# Patient Record
Sex: FEMALE | Race: BLACK OR AFRICAN AMERICAN | Employment: PART TIME | URBAN - METROPOLITAN AREA
[De-identification: names, ages, dates, MRNs, and addresses within clinical notes are randomized per-mention and may not be internally consistent; named-entity substitution may affect disease eponyms.]

---

## 2019-03-27 ENCOUNTER — ED HISTORICAL/CONVERTED ENCOUNTER (OUTPATIENT)
Dept: OTHER | Age: 29
End: 2019-03-27

## 2019-03-29 LAB — PAP SMEAR, EXTERNAL: NORMAL

## 2020-07-28 ENCOUNTER — TELEPHONE (OUTPATIENT)
Dept: OBGYN CLINIC | Age: 30
End: 2020-07-28

## 2020-07-28 NOTE — TELEPHONE ENCOUNTER
Patient having a problem with her nexplanon and would like to speak with a nurse regarding that issue.

## 2020-08-15 PROBLEM — N76.0 ACUTE VAGINITIS: Status: ACTIVE | Noted: 2020-08-15

## 2020-08-15 PROBLEM — A59.9 TRICHOMONOSIS: Status: ACTIVE | Noted: 2020-08-15

## 2020-08-15 PROBLEM — R87.619 ABNORMAL PAP SMEAR OF CERVIX: Status: ACTIVE | Noted: 2020-08-15

## 2020-08-17 ENCOUNTER — OFFICE VISIT (OUTPATIENT)
Dept: OBGYN CLINIC | Age: 30
End: 2020-08-17
Payer: MEDICAID

## 2020-08-17 VITALS
BODY MASS INDEX: 32.94 KG/M2 | DIASTOLIC BLOOD PRESSURE: 80 MMHG | HEIGHT: 62 IN | SYSTOLIC BLOOD PRESSURE: 120 MMHG | WEIGHT: 179 LBS

## 2020-08-17 DIAGNOSIS — Z30.46 SURVEILLANCE OF IMPLANTABLE SUBDERMAL CONTRACEPTIVE: ICD-10-CM

## 2020-08-17 DIAGNOSIS — N94.89 SUPPRESSION OF MENSES: Primary | ICD-10-CM

## 2020-08-17 PROCEDURE — 99213 OFFICE O/P EST LOW 20 MIN: CPT | Performed by: OBSTETRICS & GYNECOLOGY

## 2020-08-17 PROCEDURE — 11982 REMOVE DRUG IMPLANT DEVICE: CPT | Performed by: OBSTETRICS & GYNECOLOGY

## 2020-08-17 RX ORDER — NORGESTIMATE AND ETHINYL ESTRADIOL 0.25-0.035
1 KIT ORAL DAILY
Qty: 1 DOSE PACK | Refills: 14 | Status: SHIPPED | OUTPATIENT
Start: 2020-08-17 | End: 2021-10-11

## 2020-08-17 NOTE — PROGRESS NOTES
Patient is a 34yo  1 para 1001, last menstrual period 2 weeks ago, history of spontaneous vaginal delivery May 23, 2012 viable male infant 6 lbs. 7 oz. at full-term, history of Trichomonas 2014, 3+ year history of Nexplanon use, history of multiple nasal polypectomies, medical history notable for depression and anxiety, history of LGSIL, with negative colposcopic biopsies May 16, 2019, who presents for Nexplanon removal.    Patient has multiple complaints related to the C/ [place]. She is nervous because she cannot feel it very well. She has not had a cycle, and she states her libido is diminished. Currently interested in combined OCs. Patient continues to report a bleach smell in the vagina, but denies discharge itching vaginal bleeding or pelvic pain. She does not currently have a sexual partner. Last sexual encounter was early . She denies pain or bleeding with sex. .    Last Pap smear 2019-LGSIL; STI screen was negative at that time    Past Medical History:   Diagnosis Date    Abnormal Pap smear of cervix 8/15/2020    Acute vaginitis 8/15/2020    Asthma     Goiter     Graves disease     Hx of seasonal allergies     Trichomonosis 8/15/2020     Past Surgical History:   Procedure Laterality Date    HX OTHER SURGICAL      nasal polyp 3/22/2013,     HX POLYPECTOMY      nasal polypectomy    HX POLYPECTOMY N/A     Nasal x3      Social History     Socioeconomic History    Marital status: SINGLE     Spouse name: Not on file    Number of children: Not on file    Years of education: Not on file    Highest education level: Not on file   Tobacco Use    Smoking status: Current Every Day Smoker     Packs/day: 0.25     Years: 2.00     Pack years: 0.50    Smokeless tobacco: Never Used   Substance and Sexual Activity    Alcohol use: No     Alcohol/week: 0.8 standard drinks     Types: 1 Glasses of wine per week     Comment: drinks 1 small wine a month    Drug use: No    Sexual activity: Yes     Birth control/protection: Implant     Comment: James Escobar      Family History   Problem Relation Age of Onset    Hypertension Mother       Current Outpatient Medications on File Prior to Visit   Medication Sig Dispense Refill    RISPERIDONE (RISPERDAL PO) Take 1 Tab by mouth daily.  etonogestrel (IMPLANON) 68 mg impl by SubDERmal route. No current facility-administered medications on file prior to visit. Allergies as of 08/17/2020 - Review Complete 08/17/2020   Allergen Reaction Noted    Aleve [naproxen sodium] Unknown (comments) 08/15/2020    Benzocaine Swelling 01/22/2014     Constitutional:   Chaperone: accepted and present. General Appearance: healthy-appearing, well-nourished, and well-developed; black female. Level of Distress: NAD. Ambulation: ambulating normally. Psychiatric:   Insight: good judgement. Mental Status: normal mood and affect and active and alert. Orientation: to time, place, and person. Memory: recent memory normal and remote memory normal.     Head: Head: normocephalic and atraumatic. Neck:   Neck: supple, FROM, trachea midline, and no masses. Lymph Nodes: no cervical LAD, supraclavicular LAD, axillary LAD, or inguinal LAD. Thyroid: no enlargement or nodules and non-tender. Lungs:   Respiratory effort: no dyspnea. Auscultation: no wheezing, rales/crackles, or rhonchi and breath sounds normal, good air movement, and CTA except as noted. Cardiovascular:   Heart Auscultation: normal S1 and S2; no murmurs, rubs, or gallops; and RRR. Pulses including femoral / pedal: normal throughout. Breast: Breast: no masses or abnormal secretions and normal appearance. Abdomen: Bowel Sounds: normal. Inspection and Palpation: no tenderness, guarding, masses, rebound tenderness, or CVA tenderness and non-distended. Liver: non-tender and no hepatomegaly. Spleen: non-tender and no splenomegaly. Hernia: none palpable.    Incisions none    Musculoskeletal[de-identified]   Motor Strength and Tone: normal tone and motor strength. Joints, Bones, and Muscles: no contractures, malalignment, tenderness, or bony abnormalities and normal movement of all extremities. Extremities: no cyanosis, edema, varicosities, or palpable cord. Neurologic:   Gait and Station: normal gait and station. Sensation: grossly intact. Reflexes: DTRs 2+ bilaterally throughout. Skin:   Inspection and palpation: no rash, lesions, ulcer, induration, nodules, jaundice, or abnormal nevi and good turgor. Nails: normal.     Back: Thoracolumbar Appearance: normal curvature. Procedure note:    Left upper extremity was examined and the Nexplanon was noted to be on the medial aspect of the upper arm. The prior insertion site was injected with 3 mL's of 1% lidocaine. The skin was prepped and draped. A 4 mm incision was made, and the implant was milked towards the incision, grasped with a hemostat and removed without difficulty. The skin incision was closed with a Steri-Strip and draped with elastic tape. Patient was advised to remove the bandage in approximately 48 hours. The patient tolerated the procedure well. ICD-10-CM ICD-9-CM    1. Suppression of menses  N94.89 626.8    2.  Surveillance of implantable subdermal contraceptive  Z30.46 V25.43      Nexplanon removed without difficulty  Sprintec for suppression of menses  Follow-up for annual exam

## 2021-01-31 ENCOUNTER — HOSPITAL ENCOUNTER (INPATIENT)
Age: 31
LOS: 10 days | Discharge: HOME | DRG: 751 | End: 2021-02-11
Attending: PSYCHIATRY & NEUROLOGY | Admitting: PSYCHIATRY & NEUROLOGY
Payer: MEDICAID

## 2021-01-31 DIAGNOSIS — R45.850 HOMICIDAL IDEATIONS: Primary | ICD-10-CM

## 2021-01-31 DIAGNOSIS — R45.851 SUICIDAL IDEATION: ICD-10-CM

## 2021-01-31 LAB
ALBUMIN SERPL-MCNC: 4.9 G/DL (ref 3.5–5)
ALBUMIN/GLOB SERPL: 1.2 {RATIO} (ref 1.1–2.2)
ALP SERPL-CCNC: 62 U/L (ref 45–117)
ALT SERPL-CCNC: 36 U/L (ref 12–78)
ANION GAP SERPL CALC-SCNC: 8 MMOL/L (ref 5–15)
APPEARANCE UR: CLEAR
AST SERPL W P-5'-P-CCNC: 36 U/L (ref 15–37)
BACTERIA URNS QL MICRO: NEGATIVE /HPF
BASOPHILS # BLD: 0 K/UL (ref 0–0.1)
BASOPHILS NFR BLD: 0 % (ref 0–1)
BILIRUB SERPL-MCNC: 0.8 MG/DL (ref 0.2–1)
BILIRUB UR QL: NEGATIVE
BUN SERPL-MCNC: 10 MG/DL (ref 6–20)
BUN/CREAT SERPL: 7 (ref 12–20)
CA-I BLD-MCNC: 9.8 MG/DL (ref 8.5–10.1)
CHLORIDE SERPL-SCNC: 102 MMOL/L (ref 97–108)
CO2 SERPL-SCNC: 28 MMOL/L (ref 21–32)
COLOR UR: ABNORMAL
COVID-19 RAPID TEST, COVR: NOT DETECTED
CREAT SERPL-MCNC: 1.41 MG/DL (ref 0.55–1.02)
DIFFERENTIAL METHOD BLD: NORMAL
EOSINOPHIL # BLD: 0 K/UL (ref 0–0.4)
EOSINOPHIL NFR BLD: 0 % (ref 0–7)
ERYTHROCYTE [DISTWIDTH] IN BLOOD BY AUTOMATED COUNT: 14.2 % (ref 11.5–14.5)
GLOBULIN SER CALC-MCNC: 4.1 G/DL (ref 2–4)
GLUCOSE SERPL-MCNC: 110 MG/DL (ref 65–100)
GLUCOSE UR STRIP.AUTO-MCNC: NEGATIVE MG/DL
HCG SERPL QL: NEGATIVE
HCT VFR BLD AUTO: 38.7 % (ref 35–47)
HGB BLD-MCNC: 13 G/DL (ref 11.5–16)
HGB UR QL STRIP: ABNORMAL
IMM GRANULOCYTES # BLD AUTO: 0 K/UL (ref 0–0.04)
IMM GRANULOCYTES NFR BLD AUTO: 0 % (ref 0–0.5)
KETONES UR QL STRIP.AUTO: NEGATIVE MG/DL
LEUKOCYTE ESTERASE UR QL STRIP.AUTO: NEGATIVE
LYMPHOCYTES # BLD: 1.8 K/UL (ref 0.8–3.5)
LYMPHOCYTES NFR BLD: 18 % (ref 12–49)
MCH RBC QN AUTO: 32.3 PG (ref 26–34)
MCHC RBC AUTO-ENTMCNC: 33.6 G/DL (ref 30–36.5)
MCV RBC AUTO: 96.3 FL (ref 80–99)
MONOCYTES # BLD: 0.9 K/UL (ref 0–1)
MONOCYTES NFR BLD: 9 % (ref 5–13)
MUCOUS THREADS URNS QL MICRO: ABNORMAL /LPF
NEUTS SEG # BLD: 7.2 K/UL (ref 1.8–8)
NEUTS SEG NFR BLD: 73 % (ref 32–75)
NITRITE UR QL STRIP.AUTO: NEGATIVE
PH UR STRIP: 6 [PH] (ref 5–8)
PLATELET # BLD AUTO: 252 K/UL (ref 150–400)
PMV BLD AUTO: 10 FL (ref 8.9–12.9)
POTASSIUM SERPL-SCNC: 3.4 MMOL/L (ref 3.5–5.1)
PROT SERPL-MCNC: 9 G/DL (ref 6.4–8.2)
PROT UR STRIP-MCNC: 30 MG/DL
RBC # BLD AUTO: 4.02 M/UL (ref 3.8–5.2)
RBC #/AREA URNS HPF: ABNORMAL /HPF (ref 0–5)
SARS-COV-2, COV2: NORMAL
SODIUM SERPL-SCNC: 138 MMOL/L (ref 136–145)
SP GR UR REFRACTOMETRY: 1.01 (ref 1–1.03)
SPECIMEN SOURCE: NORMAL
UA: UC IF INDICATED,UAUC: ABNORMAL
UROBILINOGEN UR QL STRIP.AUTO: 0.1 EU/DL (ref 0.1–1)
WBC # BLD AUTO: 9.9 K/UL (ref 3.6–11)
WBC URNS QL MICRO: ABNORMAL /HPF (ref 0–4)

## 2021-01-31 PROCEDURE — 85025 COMPLETE CBC W/AUTO DIFF WBC: CPT

## 2021-01-31 PROCEDURE — 87635 SARS-COV-2 COVID-19 AMP PRB: CPT

## 2021-01-31 PROCEDURE — 80053 COMPREHEN METABOLIC PANEL: CPT

## 2021-01-31 PROCEDURE — 99285 EMERGENCY DEPT VISIT HI MDM: CPT

## 2021-01-31 PROCEDURE — 84703 CHORIONIC GONADOTROPIN ASSAY: CPT

## 2021-01-31 PROCEDURE — 36415 COLL VENOUS BLD VENIPUNCTURE: CPT

## 2021-01-31 PROCEDURE — 81001 URINALYSIS AUTO W/SCOPE: CPT

## 2021-01-31 PROCEDURE — 80307 DRUG TEST PRSMV CHEM ANLYZR: CPT

## 2021-01-31 NOTE — ED TRIAGE NOTES
Pt requesting admit to hospital for anxiety, depression and paranoia. Denies recent suicidal thoughts.

## 2021-02-01 PROBLEM — F32.A DEPRESSION: Status: ACTIVE | Noted: 2021-02-01

## 2021-02-01 LAB
AMPHET UR QL SCN: NEGATIVE
BARBITURATES UR QL SCN: NEGATIVE
BENZODIAZ UR QL: NEGATIVE
CANNABINOIDS UR QL SCN: NEGATIVE
COCAINE UR QL SCN: NEGATIVE
DRUG SCRN COMMENT,DRGCM: NORMAL
METHADONE UR QL: NEGATIVE
OPIATES UR QL: NEGATIVE
PCP UR QL: NEGATIVE

## 2021-02-01 PROCEDURE — 74011250637 HC RX REV CODE- 250/637: Performed by: PSYCHIATRY & NEUROLOGY

## 2021-02-01 PROCEDURE — 65220000003 HC RM SEMIPRIVATE PSYCH

## 2021-02-01 PROCEDURE — 74011250637 HC RX REV CODE- 250/637: Performed by: INTERNAL MEDICINE

## 2021-02-01 RX ORDER — SERTRALINE HYDROCHLORIDE 25 MG/1
25 TABLET, FILM COATED ORAL DAILY
Status: DISCONTINUED | OUTPATIENT
Start: 2021-02-02 | End: 2021-02-02

## 2021-02-01 RX ORDER — RISPERIDONE 2 MG/1
2 TABLET, FILM COATED ORAL DAILY
Status: DISCONTINUED | OUTPATIENT
Start: 2021-02-01 | End: 2021-02-04

## 2021-02-01 RX ORDER — ADHESIVE BANDAGE
30 BANDAGE TOPICAL DAILY PRN
Status: DISCONTINUED | OUTPATIENT
Start: 2021-02-01 | End: 2021-02-11 | Stop reason: HOSPADM

## 2021-02-01 RX ORDER — ACETAMINOPHEN 325 MG/1
650 TABLET ORAL
Status: DISCONTINUED | OUTPATIENT
Start: 2021-02-01 | End: 2021-02-11 | Stop reason: HOSPADM

## 2021-02-01 RX ORDER — POTASSIUM CHLORIDE 750 MG/1
40 TABLET, FILM COATED, EXTENDED RELEASE ORAL
Status: COMPLETED | OUTPATIENT
Start: 2021-02-01 | End: 2021-02-01

## 2021-02-01 RX ORDER — TRAZODONE HYDROCHLORIDE 50 MG/1
50 TABLET ORAL
Status: DISCONTINUED | OUTPATIENT
Start: 2021-02-01 | End: 2021-02-11 | Stop reason: HOSPADM

## 2021-02-01 RX ORDER — IBUPROFEN 200 MG
1 TABLET ORAL DAILY
Status: DISCONTINUED | OUTPATIENT
Start: 2021-02-01 | End: 2021-02-11 | Stop reason: HOSPADM

## 2021-02-01 RX ORDER — HYDROXYZINE 50 MG/1
50 TABLET, FILM COATED ORAL
Status: DISCONTINUED | OUTPATIENT
Start: 2021-02-01 | End: 2021-02-08

## 2021-02-01 RX ADMIN — TRAZODONE HYDROCHLORIDE 50 MG: 50 TABLET ORAL at 23:10

## 2021-02-01 RX ADMIN — RISPERIDONE 2 MG: 2 TABLET ORAL at 09:13

## 2021-02-01 RX ADMIN — POTASSIUM CHLORIDE 40 MEQ: 750 TABLET, FILM COATED, EXTENDED RELEASE ORAL at 19:00

## 2021-02-01 NOTE — BH NOTES
Substance Use Assessment:    Pt reports that she used to smoke weed but stopped and has been sober since 2014.
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
## 2021-02-01 NOTE — ROUTINE PROCESS
TRANSFER - OUT REPORT:    Verbal report given to danuta on [facility]  being transferred to [facility].(unit) for routine progression of care       Report consisted of patients Situation, Background, Assessment and   Recommendations(SBAR). Information from the following report(s) ED Summary was reviewed with the receiving nurse. Lines:       Opportunity for questions and clarification was provided.       Patient transported with:   [employer]

## 2021-02-01 NOTE — CONSULTS
Consult Date: 2/1/2021    Chief Complaint:   Chief Complaint   Patient presents with   [address] Problem   Patient is 27years old -American female who has been admitted for psychiatry evaluation and treatment.   She denies any history of cough fever or chills no history of nausea vomiting diarrhea abdominal pain or black stool no history of increased frequency of micturition or painful micturition no history of any joint pain or joint swelling no history of headache or dizziness loss of loss of consciousness or seizures no history of change in vision no history of chest pain or shortness of breath   HPI: HPI as above  ROS:ROS   Constitutional: Negative  HEENT: Negative  CVS: Negative  RS: Negative  GI: Negative  : Negative  Musculoskeletal: Negative  Immunology: Records are not available  Neurology: Negative  Endocrine: Negative  Haem-Onc: Negative  Skin: Negative  Psychiatry: Depression  Allergies  Allergies   Allergen Reactions    Aleve [Naproxen Sodium] Unknown (comments)    Benzocaine Swelling     FAMILY HISTORY:  Family History   Problem Relation Age of Onset    Hypertension Mother    Mother has also diabetes and aunt has cancer of the breast  SOCIAL HISTORY:  Social History     Socioeconomic History    Marital status: SINGLE     Spouse name: Not on file    Number of children: Not on file    Years of education: Not on file    Highest education level: Not on file   Occupational History    Not on file   Social Needs    Financial resource strain: Not on file    Food insecurity     Worry: Not on file     Inability: Not on file    Transportation needs     Medical: Not on file     Non-medical: Not on file   Tobacco Use    Smoking status: Current Every Day Smoker     Packs/day: 0.25     Years: 2.00     Pack years: 0.50    Smokeless tobacco: Never Used   Substance and Sexual Activity    Alcohol use: No     Alcohol/week: 0.8 standard drinks     Types: 1 Glasses of wine per week Comment: drinks 1 small wine a month    Drug use: No    Sexual activity: Yes     Birth control/protection: Implant     Comment: NEXPLANON   Lifestyle    Physical activity     Days per week: Not on file     Minutes per session: Not on file    Stress: Not on file   Relationships    Social connections     Talks on phone: Not on file     Gets together: Not on file     Attends Buddhism service: Not on file     Active member of club or organization: Not on file     Attends meetings of clubs or organizations: Not on file     Relationship status: Not on file    Intimate partner violence     Fear of current or ex partner: Not on file     Emotionally abused: Not on file     Physically abused: Not on file     Forced sexual activity: Not on file   Other Topics Concern    Not on file   Social History Narrative    Not on file         SURGICAL HISTORY:  Past Surgical History:   Procedure Laterality Date    HX OTHER SURGICAL      nasal polyp 3/22/2013, 2008    HX POLYPECTOMY      nasal polypectomy    HX POLYPECTOMY N/A     Nasal x3     PMH:  Past Medical History:   Diagnosis Date    Abnormal Pap smear of cervix 8/15/2020    Acute vaginitis 8/15/2020    Anxiety     Asthma     Depression     Goiter     Graves disease     Hx of seasonal allergies     Trichomonosis 8/15/2020     Home Medications   Prior to Admission medications    Medication Sig Start Date End Date Taking? Authorizing Provider   RISPERIDONE (RISPERDAL PO) Take 1 Tab by mouth daily. Yes Provider, Historical   norgestimate-ethinyl estradioL (ORTHO-CYCLEN, SPRINTEC) 0.25-35 mg-mcg tab Take 1 Tab by mouth daily. 8/17/20 10/11/21  Neo Levy MD   etonogestrel (IMPLANON) 68 mg impl by SubDERmal route.     Provider, Historical     Vitals:  Patient Vitals for the past 24 hrs:   Temp Pulse Resp BP SpO2   02/01/21 1111 98.6 °F (37 °C) 71 18 123/74 --   02/01/21 0813 98.6 °F (37 °C) 71 18 123/74 98 %   02/01/21 0404 99 °F (37.2 °C) 92 18 137/85 -- 01/31/21 1854 99.4 °F (37.4 °C) 100 16 (!) 148/93 100 %        General Examination: Physical Exam patient is a 27years old moderately obese -American female not in any acute distress alert awake oriented x3  HEENT: Normocephalic atraumatic skull pupils are bilaterally equal reacting to light sclera nonicteric conjunctive pink no edema of the eyelids no yellow nasal discharge tongue is pink no ulcer positive gag reflex no pharyngeal movements are intact  Neck: Supple full range of motion no JVD no HJR no lymphadenopathy no thyromegaly no carotid bruit  Chest: Expands well no localized swelling or tenderness  RS: Clear breath sounds no rhonchi no rales  CVS: S1-S2 audible regular no murmur gallop or pericardial rub  Abdomen: Soft bowel sounds are positive no organomegaly no tenderness obese  Extremeties: No edema no clubbing no cyanosis 2+  CNS: Cranial nerves II 12 are individually checked and they are intact muscle power is 4/5 reflexes 2+ plantars downgoing no sensory abnormality or deficit  Romberg sign is negative  Finger-to-nose test is negative          LABS: All labs are reviewed potassium 3.4    CXR Results  (Last 48 hours)    None          No results found for this or any previous visit (from the past 12 hour(s)).          No orders to display        ASSESSMENT/PLAN: History of Graves' disease  Hypokalemia  Obesity  Depression  I will give patient potassium supplement and check BMP in the morning  Advised to exercise and diet  Check thyroid functions      6:03 PM, 02/01/21  Neo Dominguez MD

## 2021-02-01 NOTE — ED PROVIDER NOTES
EMERGENCY DEPARTMENT HISTORY AND PHYSICAL EXAM      Date: 1/31/2021  Patient Name: Wanda Sharp    History of Presenting Illness     Chief Complaint   Patient presents with    Mental Health Problem       History Provided By: Patient    HPI: Wanda Sharp, 27 y.o. female with a past medical history significant Please, tobacco abuse, goiter, hypothyroid trichomonas, vaginitis presents to the ED with cc of suicidal and homicidal ideation. Patient has become increasingly agitated at home. Patient states she is homicidal but not to anyone in particular. Patient also states any auditory hallucinations. Not telling her to do anything sign. Patient also reported being suicidal. Moderate severity, no known exacerbating or relieving factors, no other associated signs and symptoms    There are no other complaints, changes, or physical findings at this time. PCP: Patrick Velazquez MD    No current facility-administered medications on file prior to encounter. Current Outpatient Medications on File Prior to Encounter   Medication Sig Dispense Refill    RISPERIDONE (RISPERDAL PO) Take 1 Tab by mouth daily.  norgestimate-ethinyl estradioL (ORTHO-CYCLEN, SPRINTEC) 0.25-35 mg-mcg tab Take 1 Tab by mouth daily. 1 Dose Pack 14    etonogestrel (IMPLANON) 68 mg impl by SubDERmal route.          Past History     Past Medical History:  Past Medical History:   Diagnosis Date    Abnormal Pap smear of cervix 8/15/2020    Acute vaginitis 8/15/2020    Anxiety     Asthma     Depression     Goiter     Graves disease     Hx of seasonal allergies     Trichomonosis 8/15/2020       Past Surgical History:  Past Surgical History:   Procedure Laterality Date    HX OTHER SURGICAL      nasal polyp 3/22/2013, 2008    HX POLYPECTOMY      nasal polypectomy    HX POLYPECTOMY N/A     Nasal x3       Family History:  Family History   Problem Relation Age of Onset    Hypertension Mother        Social History:  Social History Tobacco Use    Smoking status: Current Every Day Smoker     Packs/day: 0.25     Years: 2.00     Pack years: 0.50    Smokeless tobacco: Never Used   Substance Use Topics    Alcohol use: No     Alcohol/week: 0.8 standard drinks     Types: 1 Glasses of wine per week     Comment: drinks 1 small wine a month    Drug use: No       Allergies: Allergies   Allergen Reactions    Aleve [Naproxen Sodium] Unknown (comments)    Benzocaine Swelling         Review of Systems     Review of Systems   Constitutional: Negative for chills, fatigue and fever. HENT: Negative for congestion, sinus pressure and trouble swallowing. Eyes: Negative for photophobia and pain. Respiratory: Negative for cough and shortness of breath. Cardiovascular: Negative for chest pain and leg swelling. Gastrointestinal: Negative for abdominal pain, diarrhea, nausea and vomiting. Endocrine: Negative for polydipsia, polyphagia and polyuria. Genitourinary: Negative for decreased urine volume, difficulty urinating, dysuria, hematuria and urgency. Musculoskeletal: Negative for back pain, gait problem, myalgias and neck pain. Skin: Negative for pallor and rash. Allergic/Immunologic: Negative for environmental allergies and food allergies. Neurological: Negative for dizziness, facial asymmetry, speech difficulty, numbness and headaches. Hematological: Negative for adenopathy. Does not bruise/bleed easily. Psychiatric/Behavioral: Positive for suicidal ideas. Negative for agitation and self-injury. The patient is not nervous/anxious. Physical Exam     Physical Exam  Vitals signs and nursing note reviewed. Constitutional:       Appearance: Normal appearance. HENT:      Head: Atraumatic.       Right Ear: Tympanic membrane and external ear normal.      Left Ear: Tympanic membrane and external ear normal.      Nose: Nose normal.      Mouth/Throat:      Mouth: Mucous membranes are moist.   Eyes:      Extraocular Movements: Extraocular movements intact. Pupils: Pupils are equal, round, and reactive to light. Neck:      Musculoskeletal: Normal range of motion and neck supple. Cardiovascular:      Rate and Rhythm: Normal rate and regular rhythm. Pulses: Normal pulses. Heart sounds: Normal heart sounds. Pulmonary:      Breath sounds: Normal breath sounds. Abdominal:      General: Abdomen is flat. Palpations: Abdomen is soft. Musculoskeletal: Normal range of motion. Skin:     General: Skin is warm and dry. Capillary Refill: Capillary refill takes less than 2 seconds. Neurological:      General: No focal deficit present. Mental Status: She is alert and oriented to person, place, and time. Mental status is at baseline. Psychiatric:         Attention and Perception: She perceives auditory hallucinations. Mood and Affect: Affect is flat. Behavior: Behavior is withdrawn. Thought Content: Thought content is paranoid. Thought content includes homicidal and suicidal ideation. Lab and Diagnostic Study Results     Labs -     No results found for this or any previous visit (from the past 12 hour(s)). Radiologic Studies -   @lastxrresult@  CT Results  (Last 48 hours)    None        CXR Results  (Last 48 hours)    None            Medical Decision Making   - I am the first provider for this patient. - I reviewed the vital signs, available nursing notes, past medical history, past surgical history, family history and social history. - Initial assessment performed. The patients presenting problems have been discussed, and they are in agreement with the care plan formulated and outlined with them. I have encouraged them to ask questions as they arise throughout their visit. Vital Signs-Reviewed the patient's vital signs.   Patient Vitals for the past 12 hrs:   Temp Pulse Resp BP   02/04/21 1951 98.6 °F (37 °C) 90 18 129/89       Records Reviewed: Nursing Notes and Old Medical Records          ED Course:          Provider Notes (Medical Decision Making):   Patient presents with acute suicidal ideation. DDx:  2/2 MDD, schizoaffective d/o, bipolar, drug induced, organic cause such as electrolyte anomoly or infection. Stable vitals and benign exam. No obvious organic causes to explain behavior but will obtain psych labs, UA, UDS and speak with mental health professional about possible admission. Pt is currently voluntary. Sitter at bedside. Will continue to monitor while in ED  MDM       Procedures   Medical Decision Makingedical Decision Making  Performed by: Priyank García NP  PROCEDURES:  Procedures       Disposition   Disposition: Admitted to [facility] the case was discussed with the admitting physician     Admitted    DISCHARGE PLAN:  1. Current Discharge Medication List      CONTINUE these medications which have NOT CHANGED    Details   norgestimate-ethinyl estradioL (ORTHO-CYCLEN, SPRINTEC) 0.25-35 mg-mcg tab Take 1 Tab by mouth daily. Qty: 1 Dose Pack, Refills: 14      RISPERIDONE (RISPERDAL PO) Take 1 Tab by mouth daily. Associated Diagnoses: Acquired hypothyroidism      etonogestrel (IMPLANON) 68 mg impl by SubDERmal route. Associated Diagnoses: Graves' disease; Goiter diffuse           2. Follow-up Information    None       3. Return to ED if worse   4. Current Discharge Medication List            Diagnosis     Clinical Impression:   1. Homicidal ideations    2. Suicidal ideation        Attestations:    Priyank García NP    Please note that this dictation was completed with [employer]ot, the computer voice recognition software. Quite often unanticipated grammatical, syntax, homophones, and other interpretive errors are inadvertently transcribed by the computer software. Please disregard these errors. Please excuse any errors that have escaped final proofreading. Thank you.
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
## 2021-02-01 NOTE — GROUP NOTE
IP  GROUP DOCUMENTATION INDIVIDUAL                                                                          Group Therapy Note    Date: 2/1/2021    Group Start Time: 1105  Group End Time: 1200  Group Topic: Education Group - Inpatient    [facility] 2  NON ACUTE    Kusum Ambriz    [facility] GROUP DOCUMENTATION GROUP    Group Therapy Note    Facilitated discussion focus on learning to explore and communicate feelings to others    Attendees: 10 out of 12         Attendance: Attended    Patient's Goal:*STG: Attends activities and groups          Interventions/techniques: Informed, Provide feedback and Supported    Follows Directions: Followed directions    Interactions: Interacted appropriately    Mental Status: Calm    Behavior/appearance: Attentive and Cooperative    Goals Achieved: Able to engage in interactions, Able to listen to others, Able to receive feedback and Able to self-disclose      Additional Notes:  Pt was receptive to information discussed and was able to share what make her feel a certain way. Pt shared she feel happy when she is with her family that love and support her especially her son and feel angry when her mom ask her to do a lot.      Shad Guthrie
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
## 2021-02-01 NOTE — BH NOTES
During q15 minute rounds, patient was observed to be sitting on floor in bathroom shower. Through the clear bottom portion of the shower door, patient was observed rubbing her legs and between her legs. Staff had verbal contact with patient on rounding. At 80, two female staff approached patient in bathroom and encouraged her to get out of the shower, as her roommate needed to use the bathroom. Patient said \"Give me just a few more minutes\". Staff informed patient they would be opening shower door. Patient was sitting on shower floor, with a wet bed sheet wrapped & tied tightly around her neck. Patient was holding the sheet with both hands around end of the sheet. Patient was fully conscious, eyes open. Staff removed tied sheet from patient neck and helped patient to stand and sit on bed. Helped patient to dry off and dressed in green gowns. Patient was placed on 1:1 supervision per order of Dr. Irasema Fonseca. Nursing Supervisor notified. Unit Director notified and on unit to speak with patient. 1:1 Supervision to continue to maintain patient safety.

## 2021-02-01 NOTE — BH NOTES
Dr. Elina Mercado paged  to inform of H&P. Awaiting call back. Dr. Dyan Dooley called unit, verbalized understanding.
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
## 2021-02-02 LAB
ANION GAP SERPL CALC-SCNC: 5 MMOL/L (ref 5–15)
BUN SERPL-MCNC: 14 MG/DL (ref 6–20)
BUN/CREAT SERPL: 10 (ref 12–20)
CA-I BLD-MCNC: 10 MG/DL (ref 8.5–10.1)
CHLORIDE SERPL-SCNC: 105 MMOL/L (ref 97–108)
CO2 SERPL-SCNC: 28 MMOL/L (ref 21–32)
CREAT SERPL-MCNC: 1.38 MG/DL (ref 0.55–1.02)
GLUCOSE SERPL-MCNC: 130 MG/DL (ref 65–100)
POTASSIUM SERPL-SCNC: 3.7 MMOL/L (ref 3.5–5.1)
SODIUM SERPL-SCNC: 138 MMOL/L (ref 136–145)
T4 FREE SERPL-MCNC: 0.3 NG/DL (ref 0.8–1.5)
TSH SERPL DL<=0.05 MIU/L-ACNC: >100 UIU/ML (ref 0.36–3.74)

## 2021-02-02 PROCEDURE — 80048 BASIC METABOLIC PNL TOTAL CA: CPT

## 2021-02-02 PROCEDURE — 84443 ASSAY THYROID STIM HORMONE: CPT

## 2021-02-02 PROCEDURE — 36415 COLL VENOUS BLD VENIPUNCTURE: CPT

## 2021-02-02 PROCEDURE — 74011250637 HC RX REV CODE- 250/637: Performed by: PSYCHIATRY & NEUROLOGY

## 2021-02-02 PROCEDURE — 84439 ASSAY OF FREE THYROXINE: CPT

## 2021-02-02 PROCEDURE — 65220000003 HC RM SEMIPRIVATE PSYCH

## 2021-02-02 RX ORDER — SERTRALINE HYDROCHLORIDE 50 MG/1
50 TABLET, FILM COATED ORAL DAILY
Status: DISCONTINUED | OUTPATIENT
Start: 2021-02-03 | End: 2021-02-04

## 2021-02-02 RX ADMIN — HYDROXYZINE HYDROCHLORIDE 50 MG: 50 TABLET, FILM COATED ORAL at 21:23

## 2021-02-02 RX ADMIN — RISPERIDONE 2 MG: 2 TABLET ORAL at 08:37

## 2021-02-02 RX ADMIN — SERTRALINE HYDROCHLORIDE 25 MG: 25 TABLET ORAL at 08:37

## 2021-02-02 RX ADMIN — TRAZODONE HYDROCHLORIDE 50 MG: 50 TABLET ORAL at 21:23

## 2021-02-02 NOTE — BH NOTES
Patient up to bathroom. She denies having thoughts to harm herself at this time. She remains on 1:1 for safety.

## 2021-02-02 NOTE — BH NOTES
Patient awake lying in bed. She talked about feelings of paranoia and having racing thoughts. Patient denies thoughts of self-harm. 1:1 observation maintained for safety.
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
## 2021-02-02 NOTE — GROUP NOTE
IP  GROUP DOCUMENTATION INDIVIDUAL                                                                          Group Therapy Note    Date: 2/2/2021    Group Start Time: 1315  Group End Time: 1400  Group Topic: Recreational/Music Therapy    [facility] 2  NON ACUTE    Lloyd Morocho    IP [address] [facility] GROUP DOCUMENTATION GROUP    Group Therapy Note    Facilitated leisure skills group to reinforce positive coping through music, group activities, social interaction and art tasks    Attendees: 6 out of 10         Attendance: Attended    Patient's Goal:  *STG: Attends activities and groups        Interventions/techniques: Art integration and Supported    Follows Directions: Followed directions    Interactions: Interacted appropriately    Mental Status: Calm    Behavior/appearance: Cooperative    Goals Achieved: Able to engage in interactions and Able to listen to others      Additional Notes:  Receptive to listening to music. Declined to work on leisure task.  Pt was pulled out of group to meet with medical intern    Ubaldo Liz

## 2021-02-02 NOTE — BH NOTES
Pt resting in bed with eyes closed at present. Pt without noted behaviors of self harm. Pt to remain on 1:1 for safety.

## 2021-02-02 NOTE — PROGRESS NOTES
Problem: Depressed Mood (Adult/Pediatric)  Goal: *STG: Participates in treatment plan  Outcome: Progressing Towards Goal  Goal: *STG: Verbalizes anger, guilt, and other feelings in a constructive manor  Outcome: Progressing Towards Goal  Goal: *STG: Demonstrates reduction in symptoms and increase in insight into coping skills/future focused  Outcome: Progressing Towards Goal  Goal: *STG: Remains safe in hospital  Outcome: Progressing Towards Goal  Goal: *STG: Complies with medication therapy  Outcome: Progressing Towards Goal  Goal: *LTG: Understands illness and can identify signs of relapse  Outcome: Progressing Towards Goal     Problem: Anxiety  Goal: *Alleviation of anxiety  Outcome: Progressing Towards Goal   715p Pt resting in bed with eyes closed, respirations even and unlabored. Pt remains on 1:1 or safety    915p Pt sitting up on the side of her bed, writing in her journal  Pt has been up to phone, called her family to check on her son, appropriate conversations. Pt has talked with U/s about her impaired relationship with her mother has caused her not to speak up for herself. Pt stated she feels her mother somewhat takes advantage of her and that her mother has mental illness. Pt stated she loves her mother and would do anything in the world for her but she does not have the ability to tell her mother how she feels. Pt ws encouraged to discuss with her CM on tomorrow the possibility of having a virtual or phone conference to express her feeling and thoughts with her mother. Pt also encouraged to write down her thoughts and things she would like to bring forth during the meeting. Pt agreed that writing the thoughts was a good idea and she began to journal.  Pt denied feelings of self-harm, adding \"mental illness is something else\" Pt stated she know that she  has mental illness because she think back on the thing she saw with her maternal grandmother and her mother.   Pt stated she does not want to do the same things with her son. Pt stated he feels guilt as she thought she ws being a good mother but the more she thinks bout it  maybe was not a good mother. Pt stated she has a good relationship with her aunt and wishes she had the gil with her mother. Pt given much support and encouragement. Pt remains on 1:1 for safety.

## 2021-02-02 NOTE — BH NOTES
0745am: Patient is awake and walking around her room and the halls with 1:1 staff present. 0835am: Patient is her room awake and accepting to her morning medications. Patient has been up for breakfast. Patient denies having any paranoia this morning and states, \"I figured out everything that is going on\" and was asked to specify what she meant by the writer and patient stated, \"everything with my mom and I have to be there to protect my son, cousin and grandmother b/c my mom is being a savage. \" Patient denies SI and is able to verbally contract for safety with the writer. Denies HI. Denies AH/VH. Patient is medication compliant. 1:1 continued for patient safety. 5am- Patient believes that her mom is leaving around soiled menstrual pads with blood and dirty underwear with blood in the sink at home to [place] everyone else catch it. \" According to the patient her aunt, cousin and mom live together and she worries about her aunt and cousin and their wellbeing. Patient states, Milan Mccray already has caused stressed with her grandmother which caused her to die and she is trying to do the same thing to her aunt. \" Patient is not making sense with her conversations and is on a tangent. 1100am: Patient resting in her room with 1:1 staff at bedside. Patient gets up and attends groups. 1:1 continued to ensure patient safety. 1255pm- Patient is laying in the bed, sleeping and snoring at this time. 1:1 staff is present. 1:1 continued at this time. 1500: Patient got up and wanted to call her mom and check on her son but dayroom was closed due to group so patient joined the group. Patient continues to state to the writer that her thoughts are better and denies paranoia. 1:1 continued for patient safety. 1700: Patient walked laps around the unit with 1:1 staff prior to dinner and then when dinner was being served patient went to lay down.  Writer asked patient if she was going to get up to eat and patient declined and stated she did not have an appetite. Patient was reminded that it was a long time before breakfast. 1:1 continued.

## 2021-02-02 NOTE — BH NOTES
Patient remains on 1:1 for safety. No complaints or distress noted. Patient denies thoughts of self-harm.
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
## 2021-02-02 NOTE — GROUP NOTE
[facility]  GROUP DOCUMENTATION INDIVIDUAL                                                                          Group Therapy Note    Date: 2/2/2021    Group Start Time: 1100  Group End Time: 1200  Group Topic: Education Group - Inpatient    [facility]    Rachael Jay [facility] GROUP DOCUMENTATION GROUP    Group Therapy Note    Attendees: Patients discussed Automatic Negative Thoughts (ANTs) worksheet. Patients asked to identify ANTs that they have used in their personal lives. Patients then asked to come up with different ways to address them. Group ended with nostril breathing exercise. Attendance: Attended    Patient's Goal:  Attends activities and groups    Interventions/techniques: Informed, Validated, Promoted peer support, Reinforced and Supported    Follows Directions: Followed directions    Interactions: Interacted appropriately    Mental Status: Blunted, Calm, Congruent and Preoccupied    Behavior/appearance: Attentive and Cooperative, withdrawn/quiet    Goals Achieved: Able to engage in interactions and Able to listen to others      Additional Notes:  Pt attended group and was engaged. Pt was quiet throughout and did not speak. Pt was pulled from group early by the doctor.     Ebony Rain

## 2021-02-02 NOTE — BH NOTES
Patient lying in bed resting. She has not slept at all during the night. Patient reported she slept during the day. Patient told writer she is an alien and she was doing every thing her mother was doing. 1:1 observation maintained for safety.

## 2021-02-02 NOTE — BH NOTES
[facility] Recreational Therapy Assessment    Orientation:  Person, Place, Date and Situation    Reason for Admission:  Pt reported \"she was feeling paranoid, depressed and having anxiety\" Pt reported her triggers was \"not waking up in time for son's virtual school, feeling overwhelmed and mom getting into her head, not able to lay down at night and thinking people are out to get her and overthinking\" Pt denied SI and Hallucinations    Medical Precautions / Conditions:  Asthma    Impairments:     Vision:  Wears Glasses Yes      Wears Contacts No      Are they with Patient Yes     Hearing Aids No     Utilization of Ambulatory Devices:  None    Health Problems Preventing Participation in Activities:  Yes  How:  Pt reported \"only when she is singing\"    Leisure Interest Checklist:  Bowling, Cooking, Exercising, [employer], Listening to Music, Singing, TV / Movies and Walking    Does patient participate in leisure activities:  Sometimes    Setting:  Alone and With Family    Other Activities / Skills / Talents:  Pt reported \"video chat\"    Do emotions interfere with leisure activities / lifestyles:  Yes    When engaging in leisure activities, do you forget worries:  Yes    Do you belong to a [employer]: Pt stated \"she used too\"    Are you active in ditlo activities:  No    Typical Day:  Pt reported \" she is doing virtual school with her son until 2pm and when she is not working she go to her mom house for the rest of the day\"    Strengths:  Family Support, Social Support and reported \"her son\"    Limitations / Barriers:  Depressed Mood, Anxiety, Sleep, Paranoia and Non-Compliance with Meds / Follow Up    Treatment Modalities:  Stress Management, Coping Skills, Symptom Recognition, Healthy Thinking, Mood Management and Leisure Skills    Patient Educational  Needs:  Skills to recognize and challenge problematic thinking, Identify positive coping strategies and skills to manage symptoms or moods, Leisure education and Recognition of symptoms and signs    Focus of Treatment:  Introduce positive outlets for self expression and Introduce and encourage the exploration of alternative coping skills    Summary:  Pt was cooperative during assessment. Pt reported she stay with her mom because she thinks someone is out to get her. Pt reported she feel more comfortable at her mom house. Pt reported she works at [employer] and she sees Dr. Dinah Cooney and [facility].  Pt reported her goal is to get back on her medication and to stay positive and keep her mind clear\"

## 2021-02-02 NOTE — BH NOTES
Behavioral Health Treatment Team Note     Patient goal(s) for today: To continue to feel good  Treatment team focus/goals: Continue medication management and group therapy. Progress note: The patient was presenting with a bright affect and congruent mood. Her thoughts and speech were organized, and she did not appear to be responding to internal stimuli. She expressed feeling better, and denied any suicidal ideations. The therapist again questioned her about attempting to strangle herself yesterday with sheets in her room. The patient said that she wanted to sacrifice herself for her son because she is fearful that COVID will potentially affect him. She was able to challenge these thoughts though, and said that she realized that she is his primary caregiver and without her he wouldn't have anybody to take care of him. The therapist mentioned that conversation that she had with her mother and spoke about her thyroid condition. The patient admitted to not taking her thyroid medication in years, and that she used to take levothyroxine in the past. The therapist also told her that her mother has noticed that whenever she has a behavioral episode her thyroid level is usually off. The patient said that she would like to get back on her thyroid medication. LOS:  1  Expected LOS: 5-7 days     Insurance info/prescription coverage:  [facility] [employer] [facility]  Date of last family contact:  2/2/21  Family requesting physician contact today:  no  Discharge plan:  The patient will return home with outpatient services when ready to be discharged.    Guns in the home:  no   Outpatient provider(s):   and  at [facility]    Participating treatment team members: Marylu Partida, SW

## 2021-02-03 PROCEDURE — 74011250637 HC RX REV CODE- 250/637: Performed by: INTERNAL MEDICINE

## 2021-02-03 PROCEDURE — 65220000003 HC RM SEMIPRIVATE PSYCH

## 2021-02-03 PROCEDURE — 74011250637 HC RX REV CODE- 250/637: Performed by: PSYCHIATRY & NEUROLOGY

## 2021-02-03 RX ORDER — ZIPRASIDONE HYDROCHLORIDE 20 MG/1
20 CAPSULE ORAL
Status: DISCONTINUED | OUTPATIENT
Start: 2021-02-03 | End: 2021-02-08

## 2021-02-03 RX ORDER — LORAZEPAM 1 MG/1
1 TABLET ORAL
Status: DISCONTINUED | OUTPATIENT
Start: 2021-02-03 | End: 2021-02-11 | Stop reason: HOSPADM

## 2021-02-03 RX ORDER — LORAZEPAM 2 MG/ML
1 INJECTION INTRAMUSCULAR
Status: DISCONTINUED | OUTPATIENT
Start: 2021-02-03 | End: 2021-02-11 | Stop reason: HOSPADM

## 2021-02-03 RX ADMIN — RISPERIDONE 2 MG: 2 TABLET ORAL at 08:41

## 2021-02-03 RX ADMIN — TRAZODONE HYDROCHLORIDE 50 MG: 50 TABLET ORAL at 21:04

## 2021-02-03 RX ADMIN — LEVOTHYROXINE SODIUM 100 MCG: 0.03 TABLET ORAL at 14:45

## 2021-02-03 RX ADMIN — SERTRALINE HYDROCHLORIDE 50 MG: 50 TABLET ORAL at 08:41

## 2021-02-03 RX ADMIN — LORAZEPAM 1 MG: 1 TABLET ORAL at 23:12

## 2021-02-03 NOTE — GROUP NOTE
[facility] GROUP DOCUMENTATION INDIVIDUAL                                                                          Group Therapy Note    Date: 2/3/2021    Group Start Time: 1300  Group End Time: 1350  Group Topic: Process Group - Inpatient    [id number] [facility] Group    Genia Hanley    IP [address]150 [facility] GROUP DOCUMENTATION GROUP    Group Therapy Note    Attendees:4/8    Process: Pts were asked what is something they have achieved or want to achieve while at [facility]. Pts were then encouraged to share what brought them to the hospital or what problems they have been facing. Pts were encouraged to provide feedback or their own perspectives to peers. Pts were walked through a breathing exercise and asked to reflect on group         Attendance: Attended    Patient's Goal:  Pt said that she is learning how to 'self isolate and coping skills'    Interventions/techniques: Validated, Promoted peer support, Provide feedback and Supported    Follows Directions: Followed directions    Interactions: Interacted appropriately    Mental Status: Depressed and Flat    Behavior/appearance: Attentive, Neatly groomed and Withdrawn/quiet    Goals Achieved: Able to engage in interactions and Able to listen to others      Additional Notes:  Pt was quiet throughout group but was writing down notes. Pt said that she liked listening to peers. Pt did share that this is not her first time at [facility] and that she needs to 'put in more work'.  Pt is making some progress towards goals by attending and participating in group    [employer]

## 2021-02-03 NOTE — GROUP NOTE
IP  GROUP DOCUMENTATION INDIVIDUAL                                                                          Group Therapy Note    Date: 2/2/2021    Group Start Time: 1500  Group End Time: 4270  Group Topic: Process Group - Inpatient    [facility] 2 BH NON ACUTE    Sophia Recinos    IP [address]150 [facility] GROUP DOCUMENTATION GROUP    Group Therapy Note    Attendees: 4 out of 8 patients attended this process group     Patient attended a process group on this date where group members were encouraged to process their thoughts and feelings. During this group patients focused on the following topics: Support System, Grief Issues, Healthy Coping Skills and Feeling Judged. Group Members were encouraged to interact with each other while providing support and feedback. Attendance: Attended    Patient's Goal:  Patient will process thoughts and feelings while providing support and feedback to their group members. Interventions/techniques: Informed, Promoted peer support, Provide feedback and Supported    Follows Directions: Followed directions    Interactions: Interacted appropriately    Mental Status: Calm    Behavior/appearance: Attentive and Cooperative    Goals Achieved: Able to engage in interactions and Able to listen to others      Additional Notes:  Patient was quiet during group however was attentive and was able to report her own feelings.      Tony Irby, [address] [facility], [address] [place] [address]

## 2021-02-03 NOTE — GROUP NOTE
IP  GROUP DOCUMENTATION INDIVIDUAL                                                                          Group Therapy Note    Date: 2/3/2021    Group Start Time: 1100  Group End Time: 0754  Group Topic: Education Group - Inpatient    [facility] CARE MANAGEMENT    Shwetha Quiñonez    IP 1150 [facility] GROUP DOCUMENTATION GROUP    Group Therapy Note    Attendees: 5/8    Patients participated in group therapy focused on safety planning. Patients discussed safety and DC planning. Patients actively participated in group therapy and discused warning signs, coping skills, people they can ask for help and professionals that can ask for help. Attendance: Attended    Patient's Goal:  Patient working towards goal of attending group therapy    Interventions/techniques: Informed    Follows Directions: Followed directions    Interactions: Interacted appropriately    Mental Status: Depressed and Flat    Behavior/appearance: Attentive and Cooperative    Goals Achieved: Discussed safety plan      Additional Notes:  Patient actively participated in group therapy. Patient was able to give examples of warning signs, triggers, people and social settings that provide distractions and people that she can ask for help. Patient presented with a flat and depressed affect and needed prompting to speak.      Heather Granda

## 2021-02-03 NOTE — BH NOTES
Patient offered risperdol consta shot, patient refused, stated this was something we came up with, that she did not do drugs. Patient stated she read up on this and it was because she did drugs.   Patient refused to believe this was to help with her paranoia.  Patient remains on 1 to 1 for patient safety.

## 2021-02-03 NOTE — BH NOTES
PROGRESS NOTE    The therapist was notified by nursing staff that she was refusing to take her risperdal injection. The therapist met with the patient to address some of her concerns. The patient was presenting as paranoid stating that \"I know what you guys want to do with me, I know you want to get rid of me\". The therapist asked her for clarification on what she meant when she said \"get rid of\", although she did not elaborate. She also said that she was opposed to taking the injection because she doesn't want to take \"hard drugs\". The therapist explained that it is the same medication that she has been taking the pill form of in a liquid form. She was still refusing to take it. She was polite and pleasant while speaking with the therapist, although paranoid. The therapist explained the TDO process to her if her treatment eventually heads in that direction, which she said she understood. She was not pushing to leave at this time.

## 2021-02-03 NOTE — BH NOTES
Patient pleasant upon approach, affect constricted, good eye contact, and medication compliant. Patient denied paranoia, feeling like people are looking for her family or her. Patient denied depression, anxiety, suicidal and homicidal ideations and AVH. She described her previous suicidal attempt \"I thought I had to make a sacrifice for my son. \"  Patient unclear about what she hoped to accomplish with her \"sacrifice. \"  She now says that she wants to be there for her son and that her son needs her. Patient remains on 1 to 1 observation for patient safety.

## 2021-02-03 NOTE — PROGRESS NOTES
Progress Note    Jony Rivero MD             Daily Progress Note: 2/3/2021      Subjective: The patient is seen for follow  up. Cold intolerance. Problem List:  Problem List as of 2/3/2021 Date Reviewed: 2020          Codes Class Noted - Resolved    Depression ICD-10-CM: F32.9  ICD-9-CM: 025  2021 - Present        Acute vaginitis ICD-10-CM: N76.0  ICD-9-CM: 616.10  8/15/2020 - Present        Abnormal Pap smear of cervix ICD-10-CM: R87.619  ICD-9-CM: 795.00  8/15/2020 - Present        Trichomonosis ICD-10-CM: A59.9  ICD-9-CM: 131.9  8/15/2020 - Present        Acquired hypothyroidism ICD-10-CM: E03.9  ICD-9-CM: 244.9  2015 - Present        Goiter ICD-10-CM: E04.9  ICD-9-CM: 240.9  2014 - Present        Tobacco abuse ICD-10-CM: Z72.0  ICD-9-CM: 305.1  2014 - Present        Graves' disease ICD-10-CM: E05.00  ICD-9-CM: 242.00  2014 - Present              Medications reviewed  Current Facility-Administered Medications   Medication Dose Route Frequency    sertraline (ZOLOFT) tablet 50 mg  50 mg Oral DAILY    risperiDONE microspheres (RisperDAL consta) 50 mg/2 mL injection 50 mg  50 mg IntraMUSCular ONCE    hydrOXYzine HCL (ATARAX) tablet 50 mg  50 mg Oral TID PRN    traZODone (DESYREL) tablet 50 mg  50 mg Oral QHS PRN    acetaminophen (TYLENOL) tablet 650 mg  650 mg Oral Q4H PRN    magnesium hydroxide (MILK OF MAGNESIA) 400 mg/5 mL oral suspension 30 mL  30 mL Oral DAILY PRN    nicotine (NICODERM CQ) 14 mg/24 hr patch 1 Patch  1 Patch TransDERmal DAILY    risperiDONE (RisperDAL) tablet 2 mg  2 mg Oral DAILY       Review of Systems:   Cold intolerance    Objective:   Physical Exam:     Visit Vitals  /84   Pulse 88   Temp 98.6 °F (37 °C)   Resp 17   Ht 5' 2\" (1.575 m)   Wt 65.3 kg (144 lb)   SpO2 100%   BMI 26.34 kg/m²      O2 Device: Room air    Temp (24hrs), Av.5 °F (36.9 °C), Min:98.3 °F (36.8 °C), Max:98.6 °F (37 °C)    No intake/output data recorded.    No intake/output data recorded. General:  Alert, cooperative, no distress, appears stated age. Lungs:   Clear to auscultation bilaterally. Chest wall:  No tenderness or deformity. Heart:  Regular rate and rhythm, S1, S2 normal, no murmur, click, rub or gallop. Abdomen:   Soft, non-tender. Bowel sounds normal. No masses,  No organomegaly. obese   Extremities: Extremities normal, atraumatic, no cyanosis or edema. Pulses: 2+ and symmetric all extremities. Skin: Skin color, texture, turgor normal. No rashes or lesions   Neurologic: CNII-XII intact. No gross sensory or motor deficits     Data Review:       Recent Days:  Recent Labs     01/31/21 2030   WBC 9.9   HGB 13.0   HCT 38.7        Recent Labs     02/02/21  0545 01/31/21 2030    138   K 3.7 3.4*    102   CO2 28 28   * 110*   BUN 14 10   CREA 1.38* 1.41*   CA 10.0 9.8   ALB  --  4.9   TBILI  --  0.8   ALT  --  36     No results for input(s): PH, PCO2, PO2, HCO3, FIO2 in the last 72 hours. 24 Hour Results:  No results found for this or any previous visit (from the past 24 hour(s)). No orders to display        Assessment:Hypothyroidism    Non compliance        Plan:Pt is not taking thyroid supplement for long time as per pharmacist.  Start on Levothyroxine 100 mcg daily        Care Plan discussed with: pt and RN    Total time spent with patient: 30 minutes.     John Gauthier MD

## 2021-02-03 NOTE — BH NOTES
Raúl Clements 35-year-old -American lady who was seen this morning. Patient reports she has been feeling better since her admission. She reports she has not been having any active suicidal ideations. She denies having racing thoughts. She denies having any paranoia or feeling overwhelmed or fearful. She was discussed about the medications that she is on. She states it is helping her. She was also discussed about the Risperdal constant injection and she agrees to take it. She did not have any further questions. Later it was noted that she has expressed to the therapist concerns and worries about taking the injection. Patient seems to present with random thought presentation and still continues to have disorganized thought process. Mental status examination-patient alert oriented to name place person patient slightly brighter with her affect. But noted conflict and what she states to me in which she presents and states to others. She presented everything was going very well and feels good about her medications but contradictory to her therapist.  Insight judgment and coping remains impaired    Assessment plan  Continue to monitor her medications  She has not expressed any concerns on medications of Risperdal Consta we will continue to follow up with her tomorrow.   Continue Zoloft 50 mg daily    Current Facility-Administered Medications:     levothyroxine (SYNTHROID) tablet 100 mcg, 100 mcg, Oral, 6am, Tate Maki MD, 100 mcg at 02/03/21 1445    sertraline (ZOLOFT) tablet 50 mg, 50 mg, Oral, DAILY, Danisha Bergeron MD, 50 mg at 02/03/21 0841    risperiDONE microspheres (RisperDAL consta) 50 mg/2 mL injection 50 mg, 50 mg, IntraMUSCular, ONCE, Danisha Bergeron MD    hydrOXYzine HCL (ATARAX) tablet 50 mg, 50 mg, Oral, TID PRN, Nichole Medrano MD, 50 mg at 02/02/21 2123    traZODone (DESYREL) tablet 50 mg, 50 mg, Oral, QHS PRN, Nichole Medrano MD, 50 mg at 02/02/21 2123    acetaminophen (TYLENOL) tablet 650 mg, 650 mg, Oral, Q4H PRN, Danisha Bergeron MD    magnesium hydroxide (MILK OF MAGNESIA) 400 mg/5 mL oral suspension 30 mL, 30 mL, Oral, DAILY PRN, Danisha Bergeron MD    nicotine (NICODERM CQ) 14 mg/24 hr patch 1 Patch, 1 Patch, TransDERmal, DAILY, Danisha Bergeron MD, 1 Patch at 02/03/21 0874    risperiDONE (RisperDAL) tablet 2 mg, 2 mg, Oral, DAILY, Danisha Bergeron MD, 2 mg at 02/03/21 0793

## 2021-02-03 NOTE — BH NOTES
Patient did attend 1 pm group. Patient is resting quietly in bed, medication compliant and pleasant upon approach. She remains on 1 to 1 for patient safety.

## 2021-02-03 NOTE — BH NOTES
Patient ambulated up the garcia stating \"I  Need to get out of here. \" she repeated this several times as she walked over to the door hitting it several times in an attempt to leave the unit. She accused staff of \"tapping the lines,\" \"ya'll doing too much in here. \"  \"It's too much. \"  \"This is too much. \"  \"I don't trust you guys. \"  Patient agreed to talk with [place] [address]. Dr. Blayne Damon notified and orders were received to consult [place] [address]. At 18:03 to 18:07 writer spoke with Alcon at [employer]. Ramirez Kirk from [place] [address] called back waiting for patient notes. 1 to 1 continued for patient safety.

## 2021-02-03 NOTE — GROUP NOTE
IP  GROUP DOCUMENTATION INDIVIDUAL                                                                          Group Therapy Note    Date: 2/3/2021    Group Start Time: 5556  Group End Time: 1100  Group Topic: Nursing    [facility] [address] [facility], RN    IP 1150 [facility] GROUP DOCUMENTATION GROUP    Group Therapy Note    Attendees: 3       Attendance: Did not attend    Patient's Goal:      Interventions/techniques: Follows Directions:     Interactions:     Mental Status:     Behavior/appearance:     Goals Achieved:        Additional Notes:  Pt.invited but did not attend    Jennifer Steve RN

## 2021-02-03 NOTE — BH NOTES
Nursing Note    Patient is alert and oriented x 4. She denies any SI/HI/AH/VH. Patient denies any anxiety or depression. Restricted affect and calm mood. Patient states that she may have difficulty sleeping. Atarax 50 mg PO and Trazodone 50 mg PO given. She is on 1:1 observation for safety. Staff will continue to monitor patient for safety.

## 2021-02-03 NOTE — BH NOTES
Maria Wall 26-year-old -American female followed for depression, paranoia anxiety. She has been exhibiting bizarre thought process and behavior. Last evening she was reported to have had tried to strangle herself and was placed on one-to-one and for further help. Patient seen this morning who did not appear as distressed leading to her gesture of attempting to harm herself. She continues to state she is worried about her son and wanting to get her medication adjusted. Patient was challenged about her concerns of fearing/paranoia that everybody in her family will be killed. Patient continues to minimize the statements. Mental status examination patient continues to present anxious labile guarded paranoid. Denies any active suicidal or homicidal feelings. Not seen actively responding to internal stimuli or responding to any hallucinations.   Insight judgment coping remains impaired    Assessment plan  Risperdal Consta 25 mg IM scheduled  Increase her Zoloft to 50 mg daily  Continue support and group therapy  Family meeting to be scheduled      History of patient's grandmother diagnosed with schizophrenia    She has had toxic relationships that the father of the child has been seeing her son more

## 2021-02-03 NOTE — BH NOTES
Behavioral Health Treatment Team Note     Patient goal(s) for today: \"to continue to take meds\"  Treatment team focus/goals: Group therapy, DC planning, medication management     Progress note: Pt was resting in bed during check in. She reported feeling \"fine. \" She has a flat affect and congruent mood. She denied SI, HI, and AVH. She denied feeling depressed or anxious. Her thought process and speech was organized. She did not appear to be responding to internal stimuli. She was oriented x4. Writer will continue to work w/ pt to decrease SI and symptoms of paranoia, as well as medication management. Writer will also set up a family session and make a CPS report.     LOS:  2  Expected LOS: 5-7 days    Insurance info/prescription coverage:  [employer]  Date of last family contact:  2/2/21  Family requesting physician contact today: No  Discharge plan:  The pt will return home w/ outpatient services when ready to be DC  Guns in the home: No   Outpatient provider(s):  Dr. Ruben Clancy and Dr. Ana Laura Manley at [facility]     Participating treatment team members: Tsering Blood, * (assigned SW), Parveen Orantes MSW intern

## 2021-02-04 PROCEDURE — 74011250636 HC RX REV CODE- 250/636: Performed by: PSYCHIATRY & NEUROLOGY

## 2021-02-04 PROCEDURE — 74011250637 HC RX REV CODE- 250/637: Performed by: INTERNAL MEDICINE

## 2021-02-04 PROCEDURE — 74011250637 HC RX REV CODE- 250/637: Performed by: PSYCHIATRY & NEUROLOGY

## 2021-02-04 PROCEDURE — 74011000250 HC RX REV CODE- 250: Performed by: PSYCHIATRY & NEUROLOGY

## 2021-02-04 PROCEDURE — 65220000003 HC RM SEMIPRIVATE PSYCH

## 2021-02-04 RX ORDER — HALOPERIDOL 5 MG/1
5 TABLET ORAL 2 TIMES DAILY
Status: DISCONTINUED | OUTPATIENT
Start: 2021-02-04 | End: 2021-02-04

## 2021-02-04 RX ORDER — HALOPERIDOL 5 MG/1
5 TABLET ORAL 2 TIMES DAILY
Status: DISCONTINUED | OUTPATIENT
Start: 2021-02-04 | End: 2021-02-09

## 2021-02-04 RX ADMIN — LORAZEPAM 1 MG: 2 INJECTION INTRAMUSCULAR; INTRAVENOUS at 08:20

## 2021-02-04 RX ADMIN — LORAZEPAM 1 MG: 1 TABLET ORAL at 21:41

## 2021-02-04 RX ADMIN — HALOPERIDOL 5 MG: 5 TABLET ORAL at 21:42

## 2021-02-04 RX ADMIN — WATER 20 MG: 1 INJECTION INTRAMUSCULAR; INTRAVENOUS; SUBCUTANEOUS at 08:19

## 2021-02-04 RX ADMIN — TRAZODONE HYDROCHLORIDE 50 MG: 50 TABLET ORAL at 21:41

## 2021-02-04 NOTE — BH NOTES
[facility] evaluated patient, witnessed by nurse manager and assistant nurse manager, patient moved to room  242 with 1 to 1 for patient safety. Report given to oncoming sitter and oncoming staff.

## 2021-02-04 NOTE — BH NOTES
Pt. In her room agitated,paranoid, psychotic continues to think her boyfriend is in the room watching her,pt.started yelling, screaming,hit bathroom door with closed fist.threatened staff and insist her 1 to 1 staff person leave her by herself. Code Chetna Mason called p.o. meds offered pt. refused. IM meds given as ordered.

## 2021-02-04 NOTE — BH NOTES
Behavioral Health Treatment Team Note     Patient goal(s) for today: to   Treatment team focus/goals: medication management, group therapy, family session, DC planning    Progress note: Pt stated that she was feeling \"fine. \" She denied SI, HI, and AVH. However, she did seem to be experiencing VH this morning when a CARLITA was called on her. She explained that she was worried about her son bc of his father and I asked why she believed he was a concern and she said she kept \"seeing signs. \" Her thought process and speech was organized. She did present delusional and thought her son was w/ her aunt and her mom was dead. I confirmed that her mom is alive and that she has her son and he is safe. She denied feeling depressed or anxious. Writer will continue to work w/  For medication management and reschedule a family session w/ pt's mom.     LOS:  3  Expected LOS: 5-7 days    Insurance info/prescription coverage:  [employer]  Date of last family contact:  Today  Family requesting physician contact today: No  Discharge plan:  Pt will return home w/ outpatient services when ready to be DC  Guns in the home: No  Outpatient provider(s):  Dr. Jose Maria Rivera and Dr. Avani Her at [facility]    Participating treatment team members: Destin Nunn, * (assigned SW), JORDAN Caba intern

## 2021-02-04 NOTE — GROUP NOTE
[facility]  GROUP DOCUMENTATION INDIVIDUAL                                                                          Group Therapy Note    Date: 2/4/2021    Group Start Time: 1100  Group End Time: 1200  Group Topic: Education Group - Inpatient    [facility] CARE MANAGEMENT    Jaskaran Mac    IP [facility] GROUP DOCUMENTATION GROUP    Group Therapy Note    Attendees: 9 out of 13 participants. Group discussion explored the patients strengths and how they use them. Patients were provided the opportunity to discuss their strengths, talents, skills and how they could apply them toward their aspirations in life, how they were used in the past and how they could be used in the future. Each participant provided input on how they could utilize their strengths along with their coping techniques to reduce hospitalization and crisis. Attendance: Did not attend    Patient's Goal:  Attend group session      Interventions/techniques:N/A  Follows Directions: N/A    Interactions:N/A    Mental Status: N/A  Behavior/appearance:N/A  Goals Achieved: N/A      Additional Notes:  Patient did not participate in group session. Patient was provided the opportunity to engage and forwarded literature and topic discussion after group. Patient was provided instruction on how to completed the assignment and provided feedback on her worksheet.      Leyda George

## 2021-02-04 NOTE — BH NOTES
Pt.up in hallway with staff by her side,mumbling to self and cursing,quietly hostile,poor insight and judgement, still wants to leave hospital tonight. no appropriate interaction with staff or peers. pt. appears very angry. no concerns voiced,remains on 1 to 1 observation for suicidal gestures.

## 2021-02-04 NOTE — BH NOTES
COLLATERAL CONTACT    The therapist spoke with the patient's mother via phone. The therapist provided the mother with an update about the patient's recent presentation that had led to a CARLITA being called. The mother was surprised, stating that her daughter has never behaved this way before, and normally complies with treatment anytime that she comes to the hospital. She also thought that rescheduling the family session would be a good idea, due to the patient being given medications that make her tired. When speaking about her psych history, she said that she believes the patient was last hospitalized in 2009, around the time that her grandmother passed. She said that she is not close to her baseline right now. She also spoke about things she has recently seen on the patient's phone, including nude pictures she is sending people, as well as sexual videos of herself. She added that she wants to be loved very badly, although is looking for love in the wrong places. She said that these men are very degrading to her, and will call her \"slow\" in text messages.

## 2021-02-04 NOTE — BH NOTES
Pt.is lying down in bed at present time,angry at staff ,feels like we are treating her unfairly, tried to explain to pt. we must keep her safe while in our care and that her previous actions are why she is with staff supervision constantly. insight and judgement poor. remains on 1 to 1 observation for suicidal ideations.
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
## 2021-02-04 NOTE — BH NOTES
Pt.is up on unit, pt.is accepting fluids,withdraws when trying to interact with pt.insight remains limited. Judgement poor. No other concerns voiced, remains on 1 to 1 observation for suicidal ideations.
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
## 2021-02-04 NOTE — GROUP NOTE
[facility]  GROUP DOCUMENTATION INDIVIDUAL                                                                          Group Therapy Note    Date: 2/4/2021    Group Start Time: 1300  Group End Time: 9240  Group Topic: Process Group - Inpatient    [address] GROUP DOCUMENTATION GROUP    Group Therapy Note    Attendees: All pts were encouraged to attend but only 11 out of 13 attended. The group was asked to introduce themselves and to state something positive that has happened to themselves recently. We then did a positivity activity and reflected on how a positive attitude can effect someone's mood and behavior. The pts were then asked to reflect on how group went and what they did and did not like.          Attendance: Did not attend    Christianne Palma

## 2021-02-04 NOTE — BH NOTES
Pt.remains lying down in room,eyes closed,appears sleep. No distress noted. remains on 1 to 1 observation for suicidal ideation.

## 2021-02-04 NOTE — PROGRESS NOTES
Problem: Depressed Mood (Adult/Pediatric)  Goal: *STG: Remains safe in hospital  Outcome: Progressing Towards Goal  Goal: *STG: Complies with medication therapy  Outcome: Progressing Towards Goal     Problem: Anxiety  Goal: *Alleviation of anxiety  Outcome: Progressing Towards Goal     Problem: Falls - Risk of  Goal: *Absence of Falls  Description: Document Lukas Fall Risk and appropriate interventions in the flowsheet.   Outcome: Progressing Towards Goal  Note: Fall Risk Interventions:                                Problem: Depressed Mood (Adult/Pediatric)  Goal: *STG: Verbalizes anger, guilt, and other feelings in a constructive manor  Outcome: Not Progressing Towards Goal
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
## 2021-02-05 PROCEDURE — 65220000003 HC RM SEMIPRIVATE PSYCH

## 2021-02-05 PROCEDURE — 74011250637 HC RX REV CODE- 250/637: Performed by: PSYCHIATRY & NEUROLOGY

## 2021-02-05 RX ADMIN — HALOPERIDOL 5 MG: 5 TABLET ORAL at 21:14

## 2021-02-05 RX ADMIN — TRAZODONE HYDROCHLORIDE 50 MG: 50 TABLET ORAL at 21:14

## 2021-02-05 RX ADMIN — LORAZEPAM 1 MG: 1 TABLET ORAL at 09:52

## 2021-02-05 RX ADMIN — HALOPERIDOL 5 MG: 5 TABLET ORAL at 09:53

## 2021-02-05 NOTE — BH NOTES
TDO DISPO        Patient committed to [facility] for up to 7 days per Bravo Weber, on 2/5/2021. In addition, an order to treat with medication was signed for the following: Haldol, Risperdal and Invega (IM and or long-acting injection)    Paperwork placed on pt's chart.

## 2021-02-05 NOTE — PROGRESS NOTES
Problem: Depressed Mood (Adult/Pediatric)  Goal: *STG: Complies with medication therapy  Outcome: Progressing Towards Goal     Problem: Falls - Risk of  Goal: *Absence of Falls  Description: Document Lukas Fall Risk and appropriate interventions in the flowsheet.   Outcome: Progressing Towards Goal  Note: Fall Risk Interventions:                                Problem: Depressed Mood (Adult/Pediatric)  Goal: *STG: Participates in treatment plan  Outcome: Not Progressing Towards Goal  Goal: *STG: Verbalizes anger, guilt, and other feelings in a constructive manor  Outcome: Not Progressing Towards Goal  Goal: *STG: Attends activities and groups  Outcome: Not Progressing Towards Goal  Goal: *LTG: Understands illness and can identify signs of relapse  Outcome: Not Progressing Towards Goal

## 2021-02-05 NOTE — GROUP NOTE
IP  GROUP DOCUMENTATION INDIVIDUAL                                                                          Group Therapy Note    Date: 2/5/2021    Group Start Time: 1520  Group End Time: 6078  Group Topic: Recreational/Music Therapy    [facility] 2  NON ACUTE    Tavo El    IP  GROUP DOCUMENTATION GROUP    Group Therapy Note    Facilitated leisure skills group to reinforce positive coping through music, group activities, social interaction and art tasks    Attendees: 8 out of 10         Attendance: Attended    Patient's Goal:*STG: Attends activities and groups          Interventions/techniques: Art integration and Supported    Follows Directions:  Followed directions    Interactions: Interacted appropriately    Mental Status: Calm    Behavior/appearance: Cooperative    Goals Achieved: Able to engage in interactions and Able to listen to others      Additional Notes:  Receptive to listening to music and a song she selected while working on word search puzzle    Keny Newer

## 2021-02-05 NOTE — BH NOTES
Writer contacted mom to reschedule family session for Monday. She did not answer andwriter left a VM.

## 2021-02-05 NOTE — BH NOTES
Venkatesh Bullock has continued to struggle with disorganized behavior paranoia and visual hallucinations. Patient has been being tolerating her medications and taken her Haldol 5 twice daily. She reports feeling very lethargic and sleepy. She has received as needed Geodon in the previous days and court Lubna Christine had been called. Patient today states she has been not feeling as confused. Denies feeling concerned about the safety of her family members but does worry about her son. She does report challenges in relationship with the child's father. Denies any active plan of suicide or homicide. Mental status examination-patient is alert oriented to name place being in the hospital presents lethargic and sleepy and tired limited speech coherent denies SI HI denies feeling paranoid denies having any auditory visual hallucinations remains somewhat preoccupied Insight judgment coping remains impaired    Assessment plan  Continue Haldol 5 mg p.o. twice daily if she continues to be lethargic we will decrease the morning dose of Haldol to 2. Continue current medications and   update family towards progress  Provided support  Family meeting.

## 2021-02-05 NOTE — GROUP NOTE
[facility]  GROUP DOCUMENTATION INDIVIDUAL                                                                          Group Therapy Note    Date: 2/5/2021    Group Start Time: 1300  Group End Time: 4823  Group Topic: Process Group - Inpatient    [address] GROUP DOCUMENTATION GROUP    Group Therapy Note    Attendees: All pts were encouraged to attend however only 9 out of 13 pts. Group started w/ some ground rules. The pts were told that if anyone did not follow the rules they would be asked to leave. They were then asked to introduce themselves and to stated their current mood. Pts then picked from a  group of topics written on the board to discuss. Self- care, coping skills, anger management, triggers, and boundaries were the topics discussed. The pts were then asked to reflect on how group went. Attendance: Attended    Patient's Goal:  To attend groups and activities      Interventions/techniques: Challenged, Informed, Validated, Promoted peer support, Provide feedback and Supported    Follows Directions: Followed directions    Interactions: Interacted appropriately    Mental Status: Calm and Flat    Behavior/appearance: Attentive, Cooperative and Withdrawn/quiet    Goals Achieved: Able to listen to others and Able to self-disclose      Additional Notes:  Pt stated that she felt \"good. \" She was very soft spoken but did participate some w/o prompting. She stated that she uses dancing, music, breathing, and reading as her coping skills.      [facility]

## 2021-02-05 NOTE — BH NOTES
Dayshift Documentation     1:1 Observation /q2H Nursing Notes    0800  Patient received in her room. 1:1 sitter present at bedside. Patient is asleep in bed, aroused by T to obtain [facility] Vital Signs. Patient is calm at this time and no physical complaints reported. Will continue to monitor and assess. /73 HR 75 Temp 98.1 RR 16    1000  Patient presently laying in bed. 1:1 sitter present at bedside. Patient accepts PO medications and nicotine patch. Patient is calm. No physical complaints. Will continue to monitor. 1200   Patient remains calm on the unit. 1:1 staff present with patient. Observed sitting in the day room calmly. Consumed 100% of lunch. Requests that her clothes be washed. Verbalizes understanding that her TDO hearing is today. Will continue to monitor on 1:1 observation to maintain patient safety. 1400   Patient remains calm on the unit. Attended group session. TDO Hearing, committed up to 7 days with an Order to Treat. 1:1 sitter remains present with patient. No physical complaints. Patient showered. Will continue to monitor 1:1 to ensure patient safety. 1600   Patient has been coloring in dayroom with 1:1 sitter. Pleasant on approach. No behavior issues. No thoughts of self harm. Will continue to monitor and assess. 1800   Patient has been visible in the dayroom and in her room. 1:1 sitter present. Consumed 75% of dinner. Soft spoken. No physical complaints. No SI/HI/AVH. No delusional and/or paranoid statements. Will continue 1:1 supervision to ensure patient safety.

## 2021-02-05 NOTE — GROUP NOTE
CRONELIUS  GROUP DOCUMENTATION INDIVIDUAL                                                                          Group Therapy Note    Date: 2/5/2021    Group Start Time: 1100  Group End Time: 1200  Group Topic: Education [facility] GROUP DOCUMENTATION GROUP    Group Therapy Note    Attendees: 9 out of 10 participants participated in anger management group and education. Participants engaged in an activity that provided the opportunity to utilize the \"anger ball\". Patients were provided the opportunity to spin the ball and discussed topics on the ball about anger. Patients processed the topic and provided feedback on strategies on how to cope with anger. Attendance: Attended    Patient's Goal:  Participated in group    Interventions/techniques: Informed, Validated, Promoted peer support and Provide feedback    Follows Directions: Followed directions    Interactions: Interacted appropriately    Mental Status: Calm    Behavior/appearance: Attentive    Goals Achieved: Able to engage in interactions, Able to listen to others, Able to give feedback to another, Able to reflect/comment on own behavior, Able to manage/cope with feelings and Able to receive feedback      Additional Notes:  Patient participated in group by discussing her feelings and how she felt emotionally for the day. Patient stated she felt \"like a weight was lifted off her shoulders and that she feels comfortable with sharing and discussing her feelings with others. Patient discussed from the anger ball how stress relates to anger and how anger induces stress. Patient was engaged and receptive to feedback from others. Patient tone was soft spoken and delayed however she engaged throughout entire session.      Damon Lindo

## 2021-02-05 NOTE — BH NOTES
Patient is still on a 1:1 which she needs. She is sometimes agreeable and cooperative but at others not so much. Still flat affect.

## 2021-02-05 NOTE — BH NOTES
Patient is continued to present very disorganized delusional paranoid. She has been trying to escape the floor and has been difficult able to follow redirections. She continues to present with unsafe behaviors. She also has received as needed medications for her agitation and psychosis. she has had code BERTS x 2      Current Facility-Administered Medications:     haloperidoL (HALDOL) tablet 5 mg, 5 mg, Oral, BID, Danisha Bergeron MD, 5 mg at 02/04/21 2142    levothyroxine (SYNTHROID) tablet 100 mcg, 100 mcg, Oral, 6am, Rosemary Jordan MD, 100 mcg at 02/03/21 1445    ziprasidone (GEODON) capsule 20 mg, 20 mg, Oral, Q12H PRN, Christiane Seay MD    ziprasidone (GEODON) 20 mg in sterile water (preservative free) 1 mL injection, 20 mg, IntraMUSCular, Q12H PRN, Christiane Seay MD, 20 mg at 02/04/21 0819    LORazepam (ATIVAN) tablet 1 mg, 1 mg, Oral, Q6H PRN, Christiane Seay MD, 1 mg at 02/04/21 2141    LORazepam (ATIVAN) injection 1 mg, 1 mg, IntraMUSCular, Q6H PRN, Christiane Seay MD, 1 mg at 02/04/21 0820    hydrOXYzine HCL (ATARAX) tablet 50 mg, 50 mg, Oral, TID PRN, Christiane Seay MD, 50 mg at 02/02/21 2123    traZODone (DESYREL) tablet 50 mg, 50 mg, Oral, QHS PRN, Christiane Seay MD, 50 mg at 02/04/21 2141    acetaminophen (TYLENOL) tablet 650 mg, 650 mg, Oral, Q4H PRN, Christiane Seay MD    magnesium hydroxide (MILK OF MAGNESIA) 400 mg/5 mL oral suspension 30 mL, 30 mL, Oral, DAILY PRN, Danisha Bergeron MD    nicotine (NICODERM CQ) 14 mg/24 hr patch 1 Patch, 1 Patch, TransDERmal, DAILY, Christiane Seay MD, 1 Patch at 02/03/21 0841     Mental status examination-patient currently lethargic   she is receiving as needed injection.     Insight judgment coping remains poor    Assessment and plan  DC Risperdal Consta and Risperdal scheduled medications  Start Haldol 5 mg p.o. twice daily  Continue one-to-one observation

## 2021-02-06 PROCEDURE — 74011250637 HC RX REV CODE- 250/637: Performed by: INTERNAL MEDICINE

## 2021-02-06 PROCEDURE — 74011250637 HC RX REV CODE- 250/637: Performed by: PSYCHIATRY & NEUROLOGY

## 2021-02-06 PROCEDURE — 65220000003 HC RM SEMIPRIVATE PSYCH

## 2021-02-06 RX ADMIN — TRAZODONE HYDROCHLORIDE 50 MG: 50 TABLET ORAL at 22:30

## 2021-02-06 RX ADMIN — HALOPERIDOL 5 MG: 5 TABLET ORAL at 20:29

## 2021-02-06 RX ADMIN — ZIPRASIDONE HYDROCHLORIDE 20 MG: 20 CAPSULE ORAL at 09:52

## 2021-02-06 RX ADMIN — LEVOTHYROXINE SODIUM 100 MCG: 0.03 TABLET ORAL at 06:28

## 2021-02-06 RX ADMIN — HYDROXYZINE HYDROCHLORIDE 50 MG: 50 TABLET, FILM COATED ORAL at 04:21

## 2021-02-06 NOTE — BH NOTES
Client remained calm and cooperative. Refused lunch. Showered today. Voiced that she is feeling much better than before. Remained on 1:1 care for safety.

## 2021-02-06 NOTE — BH NOTES
Patient noted to sleep at short intervals. Noted to be more sociable but paranoid. Also have delusions, disorganized thought process and poor insight. Client thinks the police is out to get her. Expressed also that she wants to shower but she will not use the soap here and would prefer her mom to take in soap for her. 1:1 supervision in progress.

## 2021-02-06 NOTE — GROUP NOTE
[facility]  GROUP DOCUMENTATION INDIVIDUAL                                                                          Group Therapy Note    Date: 2/6/2021    Group Start Time: 1100  Group End Time: 1155  Group Topic: Process Group - Inpatient    [facility] 2  NON ACUTE    Last [facility] [address] GROUP DOCUMENTATION GROUP    Group Therapy Note    Today's process group was geared towards the following topics: improving mental health after discharge, outpatient resources, goals for treatment, and the importance of following up with aftercare appointments after discharge. This  educated the patient on the different resources available in the community and the importance of being medication compliant. This worker briefly discussed the importance of a safety plan and the discharge process here at the hospital.     Attendees: 6 out of 10         Attendance: Did not attend    Patient's Goal:  N/A    Interventions/techniques: N/A    Follows Directions: N/A    Interactions: N/A    Mental Status: N/A    Behavior/appearance: N/A    Goals Achieved: N/A      Additional Notes:  Patient refused to attend despite encouragement.      Lior Baptiste

## 2021-02-06 NOTE — BH NOTES
Nurse Note    Patient is alert and oriented. She denies any SI/HI/AHI/VH. Patient denies any anxiety or depression. Restricted affect and isolated mood. Patient remained in her room for the majority of the shift. Staff will continue to monitor patient for safety.

## 2021-02-06 NOTE — BH NOTES
Ms. Nilesh Leyva spent some time in day room watching TV and socializing minimally with peers. Returned to room to rest shortly after. Close supervision continue.

## 2021-02-06 NOTE — BH NOTES
Client received in dayroom watching TV. Alert and oriented. Noted to be calm and cooperative. Voiced that she is feeling sleepy. In bed lying down. Will remain under 1:1 supervision for safety.

## 2021-02-06 NOTE — BH NOTES
Pt. Is lying down in room at present time, did come to nurses station for her medications,pt.is paranoid,suspicious, reluctant to take medications,remains preoccupied as if responding to internal stimili, but states she is not hearing voices,insight and judgement is impaired. disorganized in thought. Remains on 1 to 1 supervision for suicidal gestures.

## 2021-02-07 PROCEDURE — 65220000003 HC RM SEMIPRIVATE PSYCH

## 2021-02-07 PROCEDURE — 74011250637 HC RX REV CODE- 250/637: Performed by: PSYCHIATRY & NEUROLOGY

## 2021-02-07 RX ADMIN — HALOPERIDOL 5 MG: 5 TABLET ORAL at 09:01

## 2021-02-07 RX ADMIN — HALOPERIDOL 5 MG: 5 TABLET ORAL at 20:18

## 2021-02-07 NOTE — GROUP NOTE
[facility] GROUP DOCUMENTATION INDIVIDUAL                                                                          Group Therapy Note    Date: 2/7/2021    Group Start Time: 1300  Group End Time: 1350  Group Topic: Education Group - Inpatient    SR[facility] 2  NON ACUTE    Jannet Sykes    [facility] GROUP DOCUMENTATION GROUP    Group Therapy Note    Attendees: 5         Attendance: Attended    Patient's Goal:  STG: Attends activities and groups      Interventions/techniques: Informed and Supported    Follows Directions: Followed directions    Interactions: Interacted appropriately    Mental Status: Calm and Flat    Behavior/appearance: Attentive and Cooperative    Goals Achieved: Able to engage in interactions and Able to listen to others      Additional Notes: Attended group and actively participated in group with prompts. Received materials provided and was able to verbalize with prompts from staff some symptoms of anxiety and positive ways to relieve stress.      Wanda Castaneda, CTRS

## 2021-02-07 NOTE — BH NOTES
Patient seen for follow-up she has a one-to-one staff she is alert verbal but guarded flat affect alert but disheveled says her depression is still 8 and 9 anxiety 80 she says she has paranoia trust issues. Says trazodone Risperdal helping his also takes levothyroxine she says sometime his hyperthyroidism and hypothyroidism denied any suicidal thoughts.   Her blood pressure today 1 3486 pulse 100

## 2021-02-07 NOTE — BH NOTES
Patient case discussed with the nursing staff after seen including coronary and her supervisor and Ry Solis  COVID-19 positive test (U07.1, COVID-19) with Acute Respiratory Distress Syndrome (ARDS) (J80, ARDS)  (If respiratory failure or sepsis present, add as separate assessment)    Dr. Vanessa Dawson ASU patient in bed resting one-to-one staff that patient is alert verbal polite affect little bit disheveled says when she came in she was paranoid felt somebody was watching him her and the son returned home apparently 1 time she thought somebody is going to harm her son and she felt that if she sacrificed that they will leave tomorrow apparently she tried to strangulate herself but that she she ate when she first came in she denies any further thoughts to harm self or denies any paranoid delusions nevertheless we will continue 1 more day that  Was seen see her brother will reassess tomorrow continue one-to-one at this time and it was felt by me and the staff that the patient could be unreliable sneaky and will want to make sure she is fully stable and not put herself at risk her blood pressure today is 129/76 pulse 75 continue present medication continue present inpatient hospitalization there is some concern about her safety thank you

## 2021-02-07 NOTE — GROUP NOTE
[facility] GROUP DOCUMENTATION INDIVIDUAL                                                                          Group Therapy Note    Date: 2/7/2021    Group Start Time: 1100  Group End Time: 1150  Group Topic: Process Group - Inpatient    SRM 2  NON ACUTE    Last [place] [address] GROUP DOCUMENTATION GROUP    Group Therapy Note     educated the patient on the stages of change cycle and cognitive distortions.  tasked each patient with identify their goal for today, and ways they can improve their mood during their hospitalization. Attendees: 7 out of 11         Attendance: Attended    Patient's Goal:  Attends activities and groups. Interventions/techniques: Challenged, Informed, Provide feedback and Supported    Follows Directions: Followed directions    Interactions: Interacted appropriately    Mental Status: Flat and Other lethargic    Behavior/appearance: Attentive, Cooperative, Needed prompting and Withdrawn/quiet    Goals Achieved: Able to engage in interactions, Able to listen to others and Identified feelings      Additional Notes:  Patient attended group. She is on 1:1 for safety precautions. She reported feeling tired and stated she has been sleeping too much. She shared with group that she is used to working and helping family all that time that sleeping too much does not feel right. Group informed her that maybe she needed rest. She was observed falling asleep throughout the group. She stated her goal today is to stay awake for groups.      Lior Baptiste

## 2021-02-07 NOTE — PROGRESS NOTES
Problem: Depressed Mood (Adult/Pediatric)  Goal: *STG: Remains safe in hospital  Outcome: Progressing Towards Goal  Goal: *STG: Complies with medication therapy  Outcome: Progressing Towards Goal     Problem: Falls - Risk of  Goal: *Absence of Falls  Description: Document Lukas Fall Risk and appropriate interventions in the flowsheet.   Outcome: Progressing Towards Goal  Note: Fall Risk Interventions:

## 2021-02-07 NOTE — PROGRESS NOTES
Client presents alert and oriented to person and place, shows little insight into current situation. States she feels safe with sitter but also denies she will try to harm self. Client has had 2 prior suicide attempts while in hospital setting this admission - stuck pen in electric socket and attempted hanging in bathroom. Agrees to inform staff if she develops plan of harm or experiences impulse to carry out self harm. Accepted all medications.

## 2021-02-07 NOTE — BH NOTES
0800-Client is pleasant and cooperative. Alert and oriented x 3. Appearance is semi-tidy. She came to the desk to tell staff that she was concerned about a three week old tatoo scaling up and drying out due to her not keeping it moisturized. Encouraged her to show it to the doctor when he makes rounds. Remains on 1:1 observation. 1000-Client lying quietly in bed. Respirations even and unlabored. Remains on close observation for safety. 1200-Client sitting on side of bed. Smiling upon approach. Client reports that she is trying to get herself together so she can focus and not think bad thoughts. Client ate well for lunch. Remains on 1:1 observation for safety. 1400- Client in room quietly. Has made no attempt to harm herself. Remains on 1:1 observation for safety. 1600-Client lying quietly in bed. Respirations even and unlabored. 1800-Client ate well for supper. Mom bought her some clothings in and were given to client. Remains on 1:1 observation for safety.

## 2021-02-08 PROCEDURE — 74011250637 HC RX REV CODE- 250/637: Performed by: PSYCHIATRY & NEUROLOGY

## 2021-02-08 PROCEDURE — 65220000003 HC RM SEMIPRIVATE PSYCH

## 2021-02-08 PROCEDURE — 74011250637 HC RX REV CODE- 250/637: Performed by: INTERNAL MEDICINE

## 2021-02-08 RX ORDER — CITALOPRAM 10 MG/1
10 TABLET ORAL DAILY
Status: DISCONTINUED | OUTPATIENT
Start: 2021-02-08 | End: 2021-02-11 | Stop reason: HOSPADM

## 2021-02-08 RX ADMIN — HALOPERIDOL 5 MG: 5 TABLET ORAL at 08:52

## 2021-02-08 RX ADMIN — TRAZODONE HYDROCHLORIDE 50 MG: 50 TABLET ORAL at 21:51

## 2021-02-08 RX ADMIN — CITALOPRAM HYDROBROMIDE 10 MG: 10 TABLET ORAL at 16:36

## 2021-02-08 RX ADMIN — HALOPERIDOL 5 MG: 5 TABLET ORAL at 21:51

## 2021-02-08 RX ADMIN — LEVOTHYROXINE SODIUM 100 MCG: 0.03 TABLET ORAL at 06:36

## 2021-02-08 NOTE — BH NOTES
1:1 Notes:     0750am: Patient sitting on her bed awake and is accepting to have her vital signs checked with the BHT.     0850am: Patient stated that she was \"feeling better\" with the writer and was compliant with her medications. Patient discussed how she was hoping to go home soon but she just needed to be able to call home. Patient stated that her \"thoughts were better. \" Denies SI/HI. Denies depression and or anxiety. Patient was educated that she had to be able to show that she could be safe and not a threat to herself before she could be safe enough to go home. Patient nodded her head yes and voiced understanding. 1:1 staff at bedside. 0950am: Patient has been up for her breakfast and goes back to her room to lay down. 1:1 staff is present. 1150am: Patient gets up for her meals and will go back to her room to lay down. Patient will attend groups. 1:1 staff is present. 1350pm: Patient is laying in her room with 1:1 staff present. Safety plan has been discussed with the patient by social workers. Plan is for patient to come off of 1:1 status due to patient stating that she is safe and not suicidal.     1500pm: 1:1 got discontinued per Dr. Hemalatha Son.     1630pm: Patient took a shower this evening. Patient sat in the dayroom earlier and ate a peanut butter and jelly sandwhich for a snack. Patient stated that she was doing okay. Patient received her first dose of Celexa this evening. Patient stated that she had heard of Celexa before and was compliant.

## 2021-02-08 NOTE — BH NOTES
The patient remained in bed for the evening, under 1:1 observation. She accepted her medications as ordered. Delwyn Osler was unable to sleep through the night and stayed awake with the lights on in her room. No signs of mood lability or unsafe behaviors.

## 2021-02-08 NOTE — GROUP NOTE
IP  GROUP DOCUMENTATION INDIVIDUAL                                                                          Group Therapy Note    Date: 2/8/2021    Group Start Time: 1520  Group End Time: 1600  Group Topic: Education Group - Inpatient    SRM 2  NON ACUTE    Alba Sabillon    IP [facility] GROUP DOCUMENTATION GROUP    Group Therapy Note    Facilitated discussion focused on the definition and benefits of \"mindfulness and meditation\" and introduced a \"mindfulness meditation relaxation technique\"    Attendees: 7 out of 10         Attendance: Attended    Patient's Goal:  Attend group daily    Interventions/techniques: Informed, Supported and Other /Relaxation technique    Follows Directions: Followed directions    Interactions: Interacted appropriately    Mental Status: Calm    Behavior/appearance: Cooperative    Goals Achieved: Able to engage in interactions, Able to listen to others and Discussed coping      Additional Notes:  Receptive to information discussed and to practicing mindfulness meditation. Pt reported listening to music, deep breathing and cleaning help her to relax and stay calm.     Manuel Cook

## 2021-02-08 NOTE — BH NOTES
Safety Planning    Pt verbally contracted for safety on the unit at this time w/ writer. Pt denies any active SI and reports she feels safe if the 1:1 were to be discontinued. Writer completed safety plan with Pt and taped a copy to the Pt's wall in her room where it is easily accessible for the Pt. Pt verbalized that she will utilize safety plan when necessary. Writer and Pt discussed notifying the appropriate staff such as nursing staff, behavioral health techs and social workers when she feels unsafe and has thoughts to harm herself.

## 2021-02-08 NOTE — BH NOTES
Patient tolerating her medications. She denies having any active suicidal homicidal feelings. She denies feeling paranoid or feeling fearful. She still reports feeling some depression. She was encouraged to share about her feelings relating to her son and her relationship with the child's father. She states she wants him to be more involved in caring for him. She states that he has had other children and not with her. Mental status examination-patient is alert oriented to name place person more her coherent in conversation less distressed. Denies any active plan of suicide or homicide no intent or plan. Admits to feeling depressed. Denies having any paranoia or having hallucinations. Insight judgment coping remains limited    Assessment plan  Started on Celexa 10 mg p.o. daily  Continue Haldol 5 twice daily  Provided support psychoeducation  Safety planning to come off one-to-one and patient is receptive and feels safe.     Current Facility-Administered Medications:     citalopram (CELEXA) tablet 10 mg, 10 mg, Oral, DAILY, Danisha Bergeron MD, 10 mg at 02/08/21 1636    haloperidoL (HALDOL) tablet 5 mg, 5 mg, Oral, BID, Danisha Bergeron MD, 5 mg at 02/08/21 0903    levothyroxine (SYNTHROID) tablet 100 mcg, 100 mcg, Oral, 6am, Robert Gonzalez MD, 100 mcg at 02/08/21 0636    LORazepam (ATIVAN) tablet 1 mg, 1 mg, Oral, Q6H PRN, Ceclia Seip, MD, 1 mg at 02/05/21 9117    LORazepam (ATIVAN) injection 1 mg, 1 mg, IntraMUSCular, Q6H PRN, Ceclia Seip, MD, 1 mg at 02/04/21 0820    traZODone (DESYREL) tablet 50 mg, 50 mg, Oral, QHS PRN, Ceclia Seip, MD, 50 mg at 02/06/21 2230    acetaminophen (TYLENOL) tablet 650 mg, 650 mg, Oral, Q4H PRN, Ceclia Seip, MD    magnesium hydroxide (MILK OF MAGNESIA) 400 mg/5 mL oral suspension 30 mL, 30 mL, Oral, DAILY PRN, Danisha Bergeron MD    nicotine (NICODERM CQ) 14 mg/24 hr patch 1 Patch, 1 Patch, TransDERmal, DAILY, Ceclia Seip, MD, 1 Patch at 02/08/21 5703

## 2021-02-08 NOTE — BH NOTES
This worker spoke with patient regarding suicidal ideation. The patient was in bed when this worker met with her. The patient presented with full affect and was oreinted to person, place, time, and situation. This worker discussed [address] with patient. The patient denied Suicidal ideation and stated that her treatment goal was to get back home to help with her son's schooling. The patient denied the SI, depression, and paranoia. Patient verbally contracted for safety with this worker denied SI, and stating that she will speak with staff if she has thoughts or feelings of harming her self again. The patient stated that nursing staff has been supportive and that she will seek the nursing staff to report SI. This worker will contact patient physician to inform.

## 2021-02-08 NOTE — BH NOTES
Behavioral Health Treatment Team Note     Patient goal(s) for today: Discharge  Treatment team focus/goals: Discharge coordination, treatment participation    Progress note: patient denied SI/HI or AH/VH as of this conversation. Patient denied paranoid thought associated with her admission as well. The patient was oriented to time, place, person, and situation. The patient expressed a desire to return to the home with her 6year old son to assist with virtual schooling. The patient reported that her son is currently in the care of the patient's mother and sister. This worker spoke with the patient The patient and this worker completed a safety contact and the patient acknowledged that she will communicate with nursing staff if she has thoughts of self-harm or harm to others. LOS:  7  Expected LOS: 7-9 days    Insurance info/prescription coverage:  [facility] MEDICAID/[facility]   Date of last family contact:  2/8/2021  Family requesting physician contact today:  no  Discharge plan:  Return to home with outpatient treatment coordination  Guns in the home:  no   Outpatient provider(s):   Will communicate with patient determine outpatient treatment providers    Participating treatment team members: Mariah Frazier, * (assigned SW), Mackenzie Wang LM

## 2021-02-08 NOTE — GROUP NOTE
[facility] GROUP DOCUMENTATION INDIVIDUAL                                                                          Group Therapy Note    Date: 2/8/2021    Group Start Time: 1300  Group End Time: 6442  Group Topic: Process Group - Inpatient    SRM 2  NON ACUTE    Meghana Song [address] [facility] GROUP DOCUMENTATION GROUP    Group Therapy Note    Attendees: 6 out of 9      1PM Process Group    Patients were encouraged to discuss how their \"Highs and Lows\" of the weekend and of the day. Patients were also asked to speak about their thoughts, feelings, and emotions while in the group setting. Lastly, the patients were encouraged to listen to and be supportive and respectful to their peers. Attendance: Attended    Patient's Goal:  Develop coping skills    Interventions/techniques: Validated, Promoted peer support, Provide feedback and Supported    Follows Directions:  Followed directions    Interactions: Interacted appropriately    Mental Status: Calm, Depressed and Flat    Behavior/appearance: Attentive and Cooperative    Goals Achieved: Able to listen to others, Able to reflect/comment on own behavior, Able to manage/cope with feelings, Able to receive feedback, Able to self-disclose, Displayed empathy, Identified feelings, Identified triggers and Verbalized increased hopefulness      Additional Notes: Pt reported that her \"high\" is that she is feeling better in regards to her anxiety, and her \"low\" is that she is still feeling depressed and having some SI.    Hernesto Cooper

## 2021-02-08 NOTE — SUICIDE SAFETY PLAN
SAFETY PLAN    A suicide Safety Plan is a document that supports someone when they are having thoughts of suicide. Warning Signs that indicate a suicidal crisis may be developing: What (situations, thoughts, feelings, body sensations, behaviors, etc.) do you experience that lets you know you are beginning to think about suicide? 1. \"Feeling like someone is watching my every move\"  2. \"Thinking my son would be better without me here\"  3. \"Not having enough sleep\"     Internal Coping Strategies:  What things can I do (relaxation techniques, hobbies, physical activities, etc.) to take my mind off my problems without contacting another person? 1. Deep breathing  2. Asking for medication  3. Going into the day room  4. Take a nap    People and social settings that provide distraction: Who can I call or where can I go to distract me? 1. Name: Nursing Staff    2. Name: [facility]   3. Place: Walk the hallways            4. Place: Day room    People whom I can ask for help: Who can I call when I need help - for example, friends, family, clergy, someone else? 1. Name: Blanca Montemayor                Phone: [phone]  2. Name: Lavinia  Phone: [phone]  3. Name: Mini Светлана  Phone: [phone]    Professionals or [address] [place] [address] I can contact during a crisis: Who can I call for help - for example, my doctor, my psychiatrist, my psychologist, a mental health provider, a suicide hotline? 1. Clinician Name: [facility]   Phone: [phone]      Clinician Pager or Emergency Contact #:     2. Clinician Name: Assigned  - Maurilio   Phone: [phone]      Clinician Pager or Emergency Contact #:     3. Suicide Prevention Lifeline: 8-116-351-TALK (5413)    4.  [address] [place] [address] Emergency Services -  for example, [facility] suicide hotline, [facility]:       Emergency Services Address: [address] [phone]      Emergency Services USZFY:[phone] / 911  Making the environment safe: How can I make my environment (house/apartment/living space) safer? For example, can I remove guns, medications, and other items? 1. Talk to Staff  2.  Utilize Coping skills

## 2021-02-09 PROCEDURE — 74011250637 HC RX REV CODE- 250/637: Performed by: PSYCHIATRY & NEUROLOGY

## 2021-02-09 PROCEDURE — 65220000003 HC RM SEMIPRIVATE PSYCH

## 2021-02-09 PROCEDURE — 74011250637 HC RX REV CODE- 250/637: Performed by: INTERNAL MEDICINE

## 2021-02-09 RX ORDER — HALOPERIDOL 1 MG/1
2 TABLET ORAL DAILY
Status: DISCONTINUED | OUTPATIENT
Start: 2021-02-10 | End: 2021-02-11 | Stop reason: HOSPADM

## 2021-02-09 RX ORDER — HALOPERIDOL 5 MG/1
5 TABLET ORAL
Status: DISCONTINUED | OUTPATIENT
Start: 2021-02-10 | End: 2021-02-11 | Stop reason: HOSPADM

## 2021-02-09 RX ORDER — HALOPERIDOL DECANOATE 50 MG/ML
50 INJECTION INTRAMUSCULAR
Status: DISCONTINUED | OUTPATIENT
Start: 2021-02-10 | End: 2021-02-11 | Stop reason: HOSPADM

## 2021-02-09 RX ADMIN — CITALOPRAM HYDROBROMIDE 10 MG: 10 TABLET ORAL at 08:57

## 2021-02-09 RX ADMIN — TRAZODONE HYDROCHLORIDE 50 MG: 50 TABLET ORAL at 21:27

## 2021-02-09 RX ADMIN — HALOPERIDOL 5 MG: 5 TABLET ORAL at 08:57

## 2021-02-09 RX ADMIN — HALOPERIDOL 5 MG: 5 TABLET ORAL at 21:25

## 2021-02-09 RX ADMIN — LEVOTHYROXINE SODIUM 100 MCG: 0.03 TABLET ORAL at 06:43

## 2021-02-09 NOTE — GROUP NOTE
IP  GROUP DOCUMENTATION INDIVIDUAL                                                                          Group Therapy Note    Date: 2/9/2021    Group Start Time: 1520  Group End Time: 1600  Group Topic: Recreational/Music Therapy    SRM 2 BH NON ACUTE    Junius Papa    IP [address] [facility] GROUP DOCUMENTATION GROUP    Group Therapy Note    Facilitated leisure skills group to reinforce positive coping thought music, social interaction, group activities and art tasks    Attendees: 6 out of 10         Attendance: Attended    Patient's Goal:  Attend group daily    Interventions/techniques: Art integration and Supported    Follows Directions: Followed directions    Interactions: Interacted appropriately    Mental Status: Calm    Behavior/appearance: Cooperative    Goals Achieved: Able to engage in interactions and Able to listen to others      Additional Notes:  Receptive to listening to music while working on art task.  Displayed appropriate behavior    Reddy Min

## 2021-02-09 NOTE — BH NOTES
FAMILY SESSION    The therapist facilitated a family session between the patient and her mother. The patient was presenting mostly flat throughout the session, although seemed engaged in the conversation. She expressed that she feels much better, and that she is thinking more clearly now. She believes that her medications are helping her, and spoke about wanting to take them consistently when she is discharged. She said that she wants to buy a pill box to stay more organized and to remember to take her medication. They also spoke about the injection that the patient has an order to treat for. The patient was hesitant to take it at fist, stating that she doesn't;t like needles. However, her mother was able to convince her to take the injection before she leaves voluntarily. They both asked appropriate questions about the injection that the therapist answered. The patient said that she would be willing to get it once a month through [facility] where she is an existing patient at. Her mom also mentioned that she works in the nursing field and that she has given allergy injections to the patient before at home, and might be able to give her injection moving forward. The therapist advised them to speak with the staff members at [facility] about that. The patient also said that she is trying to leave the stress with her ex-boyfriends behind her. Her mother encouraged her to face her issues and process her feelings, rather than avoiding everything altogether. She added that sometimes the patient can put on a facade and act like everything is okay, which she encouraged her daughter not to do. The patient said that she would be willing to see a therapist at [facility] to have an outlet to discuss stressors with. She said that she saw Enrique Cook in the past and enjoyed speaking with him. She also said that she has decided to stay with her mother for a few months once she is discharged to focus on herself, and her stability. She said that she would continue to work during this time while at her mothers, although asked about working reduced hours for a little bit. The therapist agreed with that plan, and told her that she would be able to include something about that in her work letter that she receives before being discharged. The mother also felt comfortable with this plan. The therapist also offered the patient intensive in-home services for her son, although the patient said that she doesn't feel like her sons behaviors are unmanageable, and declined that service. They both felt comfortable with the patient's discharge once she takes her injection.

## 2021-02-09 NOTE — BH NOTES
Patient up for meals and groups, social with peers. Patient was medication compliant. She denied SI,HI, AVH, depression and anxiety. She c/o \"bad dreams\" and \"waking up in the middle of the night. \"  Patient encouraged to talk about this with the Doctor today, she verbalized understanding. Patient remains on close observation, Q 15 minute checks.

## 2021-02-09 NOTE — GROUP NOTE
[facility]  GROUP DOCUMENTATION INDIVIDUAL                                                                          Group Therapy Note    Date: 2/9/2021    Group Start Time: 1300  Group End Time: 1400  Group Topic: Process Group - Inpatient    [facility]    Rachael Jay    IP [address] [facility] GROUP DOCUMENTATION GROUP    Group Therapy Note    Attendees: Patients encouraged to discuss the things bringing them to the hospital, the things that have been bothering them, and the things that have been on their mind. Patients encouraged to share freely and support their peers. Themes surrounding family, loss of control emerged and were discussed. Attendance: Attended, left early    Patient's Goal:  Attends activities and groups    Interventions/techniques: Validated, Promoted peer support, Reinforced and Supported    Follows Directions:  Followed directions    Interactions: Interacted appropriately    Mental Status: Calm, Congruent and Flat    Behavior/appearance: Attentive and Cooperative    Goals Achieved: Able to engage in interactions and Able to listen to others      Additional Notes:  Pt attended group for about 5 minutes and left, did not return    [employer] OB Triage Note  Ada Plasencia  MRN: [id number]  Gestational Age: 38w3d      Ada Plasencia 

## 2021-02-09 NOTE — GROUP NOTE
[facility]  GROUP DOCUMENTATION INDIVIDUAL                                                                          Group Therapy Note    Date: 2/9/2021    Group Start Time: 1100  Group End Time: 1200  Group Topic: Education Group - Inpatient    [facility]    Rachael Jay [facility] GROUP DOCUMENTATION GROUP    Group Therapy Note    Attendees: Patients went over 2B psycho education group on feelings and triggers. Patients asked to identify feelings and the triggers that make them feel those emotions. Patients processed their triggers, were asked to identify triggers that they can control vs triggers that they cannot control. Patients processed their emotions, were asked to give support and feedback to their peers. Themes around mistrust, misunderstanding, and differences emerged and were discussed. Attendance: Attended    Patient's Goal:  Attends activities and groups    Interventions/techniques: Informed, Validated, Promoted peer support, Reinforced and Supported    Follows Directions: Followed directions    Interactions: Interacted appropriately    Mental Status: Blunted, Calm, Congruent and Preoccupied    Behavior/appearance: Attentive, Cooperative and Withdrawn/quiet    Goals Achieved: Able to engage in interactions, Able to listen to others and Able to give feedback to another      Additional Notes:  Pt attended group and completed worksheet, was pulled early by therapist and did not have a chance to share.     Gilda Mckeon

## 2021-02-09 NOTE — GROUP NOTE
[facility]  GROUP DOCUMENTATION INDIVIDUAL                                                                          Group Therapy Note    Date: 2/8/2021    Group Start Time: 2000  Group End Time: 2050  Group Topic: Recreational/Music Therapy    SRM 2  NON ACUTE    Sean Griffin    [facility] GROUP DOCUMENTATION GROUP    Group Therapy Note    Attendees:5    Introduce healthy leisure task skill as positive way to cope and manage mood         Attendance: Attended    Patient's Goal: STG: Attends activities and groups      Interventions/techniques: Art integration and Supported    Follows Directions: Followed directions    Interactions: Interacted appropriately    Mental Status: Calm and Flat    Behavior/appearance: Attentive and Cooperative    Goals Achieved: Able to engage in interactions and Able to listen to others      Additional Notes: Attended group and actively participated. Accepted leisure packet and listened to songs played in group. Was receptive to intervention. Used leisure time constructively.      Adria Munoz, MINS

## 2021-02-09 NOTE — BH NOTES
Hedy Gary watched television during the early evening, accepted her ordered medications, and fell asleep prior to 2200. She was pleasant upon contact. No issues or changes to note.

## 2021-02-10 PROCEDURE — 74011250636 HC RX REV CODE- 250/636: Performed by: PSYCHIATRY & NEUROLOGY

## 2021-02-10 PROCEDURE — 74011250637 HC RX REV CODE- 250/637: Performed by: PSYCHIATRY & NEUROLOGY

## 2021-02-10 PROCEDURE — 74011250637 HC RX REV CODE- 250/637: Performed by: INTERNAL MEDICINE

## 2021-02-10 PROCEDURE — 65220000003 HC RM SEMIPRIVATE PSYCH

## 2021-02-10 RX ADMIN — TRAZODONE HYDROCHLORIDE 50 MG: 50 TABLET ORAL at 21:22

## 2021-02-10 RX ADMIN — HALOPERIDOL DECANOATE 50 MG: 50 INJECTION INTRAMUSCULAR at 10:26

## 2021-02-10 RX ADMIN — LEVOTHYROXINE SODIUM 100 MCG: 0.03 TABLET ORAL at 06:00

## 2021-02-10 RX ADMIN — HALOPERIDOL 2 MG: 1 TABLET ORAL at 08:16

## 2021-02-10 RX ADMIN — HALOPERIDOL 5 MG: 5 TABLET ORAL at 21:23

## 2021-02-10 RX ADMIN — CITALOPRAM HYDROBROMIDE 10 MG: 10 TABLET ORAL at 08:16

## 2021-02-10 NOTE — BH NOTES
Patient still tired looking during day, somewhat sleepy. States her thoughts has been clear, denies feeling fearful, confused or paranoid.  Denies having suicidal thoughts  States she feels ready and wanst to go home    mse- tired looking, restricted affect  No command hallu  No disorg behavior reported    A/p  Decrease haldol to 2 mg po qam  haldoldecoanate 50 mg IM for psychosis  D/c planning in 1-2 days  Family meeting reviewed    Current Facility-Administered Medications:     [START ON 2/10/2021] haloperidol decanoate (HALDOL DECANOATE) 50 mg/mL LA injection 50 mg, 50 mg, IntraMUSCular, Q28D, Danisha Bergeron MD    [START ON 2/10/2021] haloperidoL (HALDOL) tablet 5 mg, 5 mg, Oral, QHS, Danisha Bergeron MD    [START ON 2/10/2021] haloperidoL (HALDOL) tablet 2 mg, 2 mg, Oral, DAILY, Danisha Bergeron MD    citalopram (CELEXA) tablet 10 mg, 10 mg, Oral, DAILY, Danisha Bergeron MD, 10 mg at 02/09/21 0857    levothyroxine (SYNTHROID) tablet 100 mcg, 100 mcg, Oral, 6am, Trevor Marvin MD, 100 mcg at 02/09/21 0250    LORazepam (ATIVAN) tablet 1 mg, 1 mg, Oral, Q6H PRN, Fransisco Escobar MD, 1 mg at 02/05/21 3211    LORazepam (ATIVAN) injection 1 mg, 1 mg, IntraMUSCular, Q6H PRN, Danisha Bergeron MD, 1 mg at 02/04/21 0820    traZODone (DESYREL) tablet 50 mg, 50 mg, Oral, QHS PRN, Fransisco Escobar MD, 50 mg at 02/09/21 2127    acetaminophen (TYLENOL) tablet 650 mg, 650 mg, Oral, Q4H PRN, Fransisco Escobar MD    magnesium hydroxide (MILK OF MAGNESIA) 400 mg/5 mL oral suspension 30 mL, 30 mL, Oral, DAILY PRN, Danisha Bergeron MD    nicotine (NICODERM CQ) 14 mg/24 hr patch 1 Patch, 1 Patch, TransDERmal, DAILY, Fransisco Escobar MD, 1 Patch at 02/09/21 6009

## 2021-02-10 NOTE — BH NOTES
Behavioral Health Treatment Team Note     Patient goal(s) for today: \"Just finishing up on everything, and attending all the groups\"  Treatment team focus/goals: To continue group therapy and medication management. Progress note: The patient was laying down in her bed during the session. She was presenting with a bright affect and congruent mood. She reported feeling good. She denied SI/HI, or AH/VH's. She said that she had just received her injection and that it wasn't as bad as she thought it would be. She said that she is ready for her discharge tomorrow, and wants to attend as many groups she can before going home. LOS:  9  Expected LOS: 9-11 days     Insurance info/prescription coverage:  [employer]   Date of last family contact:  2/9/21  Family requesting physician contact today:  no  Discharge plan:  The patient will be staying with her mother for a little bit once discharging, and will follow-up with outpatient services at [facility]. Guns in the home:  no   Outpatient provider(s):   at [facility].      Participating treatment team members: Eduardo Peña, JENNIFER

## 2021-02-10 NOTE — BH NOTES
The patient remained in bed for the evening, resting. She was pleasant upon contact and accepted medications as ordered. Shelby Rodriguez denied thoughts of harming herself. No significant changes to note.

## 2021-02-10 NOTE — BH NOTES
Patient reports feeling better. She had taken her monthly decanoate injection Haldol 50 mg IM today. She has not been feeling any paranoia racing thoughts or any hallucinations. She does not feel worried about the safety of her children. Mental status examination patient is alert oriented to name place person slightly bright affect no active SI do not currently not mood suicidal gesture.   Insight judgment coping continuing to improve    Assessment plan  Continue current medications provided support  Discharge planning for 2/11/2021  Family meeting reviewed    Current Facility-Administered Medications:     haloperidol decanoate (HALDOL DECANOATE) 50 mg/mL LA injection 50 mg, 50 mg, IntraMUSCular, Q28D, Danisha Bergeron MD, 50 mg at 02/10/21 1026    haloperidoL (HALDOL) tablet 5 mg, 5 mg, Oral, QHS, Danisha Bergeron MD    haloperidoL (HALDOL) tablet 2 mg, 2 mg, Oral, DAILY, Danisha Bergeron MD, 2 mg at 02/10/21 0816    citalopram (CELEXA) tablet 10 mg, 10 mg, Oral, DAILY, Danisha Bergeron MD, 10 mg at 02/10/21 0816    levothyroxine (SYNTHROID) tablet 100 mcg, 100 mcg, Oral, 6am, Rachelle Rehman MD, 100 mcg at 02/10/21 0600    LORazepam (ATIVAN) tablet 1 mg, 1 mg, Oral, Q6H PRN, Danisha Bergeron MD, 1 mg at 02/05/21 9484    LORazepam (ATIVAN) injection 1 mg, 1 mg, IntraMUSCular, Q6H PRN, Danisha Bergeron MD, 1 mg at 02/04/21 0820    traZODone (DESYREL) tablet 50 mg, 50 mg, Oral, QHS PRN, Berenice uHtson MD, 50 mg at 02/09/21 2127    acetaminophen (TYLENOL) tablet 650 mg, 650 mg, Oral, Q4H PRN, Berenice Hutson MD    magnesium hydroxide (MILK OF MAGNESIA) 400 mg/5 mL oral suspension 30 mL, 30 mL, Oral, DAILY PRN, Danisha Bergeron MD    nicotine (NICODERM CQ) 14 mg/24 hr patch 1 Patch, 1 Patch, TransDERmal, DAILY, Berenice Hutson MD, 1 Patch at 02/10/21 2180

## 2021-02-10 NOTE — GROUP NOTE
[facility] GROUP DOCUMENTATION INDIVIDUAL                                                                          Group Therapy Note    Date: 2/10/2021    Group Start Time: 1300  Group End Time: 1400  Group Topic: Process Group - Inpatient    [facility] CARE MANAGEMENT    Sheridan Rahman    IP [address] [facility] GROUP DOCUMENTATION GROUP    Group Therapy Note    Attendees: 4 out of 9 participants engaged in process group by verbally identifying their emotions, feelings and triggers. Each individuals processed coping skills and discussed decreasing risk factors upon entering back into the community. Attendance: Attended    Patient's Goal:  Participate in group    Interventions/techniques: Validated, Promoted peer support and Supported    Follows Directions: Followed directions    Interactions: Interacted appropriately    Mental Status: Calm    Behavior/appearance: Attentive    Goals Achieved: Able to engage in interactions, Able to listen to others, Able to give feedback to another and Able to receive feedback      Additional Notes:  Patient appeared to be engaged and indicated that she was feeling good. Patient discussed how her emotions have changed since entering the unit and now. Patient discussed her anxiety and feeling overwhelmed with her day to day struggles and the co-dependent relationships with her relatives. Patient state how her inability to manage her stress and medication became over barring. Patient was not able to identify how she would utilize her support system upon discharge. Facilitator will continue to support the patient with identifying coping strategies and a support networking system to reduce hospitalization.      Jannet Gold

## 2021-02-10 NOTE — BH NOTES
Patient pleasant upon approach, compliant with medications, attending groups. Affect blunted, good eye contact. She denied paranoia, SI, HI, depression, Anxiety and AVH. She remains on close observation, Q 15 minute checks.

## 2021-02-10 NOTE — GROUP NOTE
[facility]  GROUP DOCUMENTATION INDIVIDUAL                                                                          Group Therapy Note    Date: 2/10/2021    Group Start Time: 1105  Group End Time: 6203  Group Topic: Education [address] Group    Dayan Genia    IP [address] [facility] GROUP DOCUMENTATION GROUP    Group Therapy Note    Attendees: 6/10    Psych-Ed: Pts were asked to rate their mood as a check in question. Pts were then walked through the safety plan and encouraged to share various warning signs that they experience, what internal coping strategies they have, people/places  that can offer distraction or help and what they can do to make their environment safer. Pts were then encouraged to reflect on group and the safety planning process         Attendance: Attended    Patient's Goal: Pt said that she is 'looking forward to going home'     Interventions/techniques: Informed, Validated, Provide feedback and Supported    Follows Directions: Followed directions    Interactions: Interacted appropriately    Mental Status: Calm and Flat    Behavior/appearance: Attentive, Neatly groomed and Withdrawn/quiet    Goals Achieved: Able to listen to others      Additional Notes:  Pt said that she had already filled out her safety plan with her  so she did not fill out another one. Pt said that cooking 'helps her a lot' and that she enjoys the 'process of making food'. Pt was quiet throughout most of group but did pay attention to peers.  Pt is making progress towards goals by attending and participating in group    [facility]

## 2021-02-11 VITALS
OXYGEN SATURATION: 100 % | DIASTOLIC BLOOD PRESSURE: 80 MMHG | BODY MASS INDEX: 26.5 KG/M2 | WEIGHT: 144 LBS | HEIGHT: 62 IN | SYSTOLIC BLOOD PRESSURE: 110 MMHG | HEART RATE: 82 BPM | TEMPERATURE: 98.3 F | RESPIRATION RATE: 18 BRPM

## 2021-02-11 PROCEDURE — 74011250637 HC RX REV CODE- 250/637: Performed by: INTERNAL MEDICINE

## 2021-02-11 PROCEDURE — 74011250637 HC RX REV CODE- 250/637: Performed by: PSYCHIATRY & NEUROLOGY

## 2021-02-11 RX ORDER — CITALOPRAM 10 MG/1
10 TABLET ORAL DAILY
Qty: 30 TAB | Refills: 0 | Status: SHIPPED | OUTPATIENT
Start: 2021-02-12 | End: 2022-10-12

## 2021-02-11 RX ORDER — TRAZODONE HYDROCHLORIDE 50 MG/1
50 TABLET ORAL
Qty: 30 TAB | Refills: 0 | Status: ON HOLD | OUTPATIENT
Start: 2021-02-11 | End: 2022-10-12 | Stop reason: SDUPTHER

## 2021-02-11 RX ORDER — LEVOTHYROXINE SODIUM 100 UG/1
100 TABLET ORAL
Qty: 30 TAB | Refills: 0 | Status: SHIPPED | OUTPATIENT
Start: 2021-02-12 | End: 2022-10-12

## 2021-02-11 RX ORDER — HALOPERIDOL 5 MG/1
5 TABLET ORAL
Qty: 30 TAB | Refills: 0 | Status: SHIPPED | OUTPATIENT
Start: 2021-02-11 | End: 2022-10-12

## 2021-02-11 RX ORDER — HALOPERIDOL DECANOATE 50 MG/ML
50 INJECTION INTRAMUSCULAR
Qty: 1 VIAL | Refills: 0 | Status: SHIPPED
Start: 2021-03-10

## 2021-02-11 RX ADMIN — CITALOPRAM HYDROBROMIDE 10 MG: 10 TABLET ORAL at 08:35

## 2021-02-11 RX ADMIN — LEVOTHYROXINE SODIUM 100 MCG: 0.03 TABLET ORAL at 06:04

## 2021-02-11 RX ADMIN — HALOPERIDOL 2 MG: 1 TABLET ORAL at 08:35

## 2021-02-11 NOTE — BH NOTES
Behavioral Health Transition Record to Provider    Patient Name: Deepika Solis  YOB: 1990  Medical Record Number: [id number]  Date of Admission: 1/31/2021  Date of Discharge:     Attending Provider: Phil Garcia MD  Discharging Provider:   To contact this individual call [phone] and ask the  to page. If unavailable, ask to be transferred to [address] [place] [address] Provider on call. [facility] Provider will be available on call 24/7 and during holidays. Primary Care Provider: Iris Dolan MD    Allergies   Allergen Reactions    Aleve [Naproxen Sodium] Unknown (comments)    Benzocaine Swelling       Reason for Admission: The patient was admitted for psychosis, including paranoia, and disorganized thoughts and speech/behaviors. Admission Diagnosis: Depression [F32.9]    * No surgery found *    Results for orders placed or performed during the hospital encounter of 01/31/21   COVID-19 RAPID TEST   Result Value Ref Range    Specimen source Nasopharyngeal      COVID-19 rapid test Not Detected Not Detected     CBC WITH AUTOMATED DIFF   Result Value Ref Range    WBC 9.9 3.6 - 11.0 K/uL    RBC 4.02 3.80 - 5.20 M/uL    HGB 13.0 11.5 - 16.0 g/dL    HCT 38.7 35.0 - 47.0 %    MCV 96.3 80.0 - 99.0 FL    MCH 32.3 26.0 - 34.0 PG    MCHC 33.6 30.0 - 36.5 g/dL    RDW 14.2 11.5 - 14.5 %    PLATELET 583 349 - 200 K/uL    MPV 10.0 8.9 - 12.9 FL    NEUTROPHILS 73 32 - 75 %    LYMPHOCYTES 18 12 - 49 %    MONOCYTES 9 5 - 13 %    EOSINOPHILS 0 0 - 7 %    BASOPHILS 0 0 - 1 %    IMMATURE GRANULOCYTES 0 0.0 - 0.5 %    ABS. NEUTROPHILS 7.2 1.8 - 8.0 K/UL    ABS. LYMPHOCYTES 1.8 0.8 - 3.5 K/UL    ABS. MONOCYTES 0.9 0.0 - 1.0 K/UL    ABS. EOSINOPHILS 0.0 0.0 - 0.4 K/UL    ABS. BASOPHILS 0.0 0.0 - 0.1 K/UL    ABS. IMM.  GRANS. 0.0 0.00 - 0.04 K/UL    DF AUTOMATED     METABOLIC PANEL, COMPREHENSIVE   Result Value Ref Range    Sodium 138 136 - 145 mmol/L    Potassium 3.4 (L) 3.5 - 5.1 mmol/L Chloride 102 97 - 108 mmol/L    CO2 28 21 - 32 mmol/L    Anion gap 8 5 - 15 mmol/L    Glucose 110 (H) 65 - 100 mg/dL    BUN 10 6 - 20 mg/dL    Creatinine 1.41 (H) 0.55 - 1.02 mg/dL    BUN/Creatinine ratio 7 (L) 12 - 20      GFR est AA 53 (L) >60 ml/min/1.73m2    GFR est non-AA 44 (L) >60 ml/min/1.73m2    Calcium 9.8 8.5 - 10.1 mg/dL    Bilirubin, total 0.8 0.2 - 1.0 mg/dL    AST (SGOT) 36 15 - 37 U/L    ALT (SGPT) 36 12 - 78 U/L    Alk. phosphatase 62 45 - 117 U/L    Protein, total 9.0 (H) 6.4 - 8.2 g/dL    Albumin 4.9 3.5 - 5.0 g/dL    Globulin 4.1 (H) 2.0 - 4.0 g/dL    A-G Ratio 1.2 1.1 - 2.2     HCG QL SERUM   Result Value Ref Range    HCG, Ql. Negative Negative     URINALYSIS W/ REFLEX CULTURE    Specimen: Urine   Result Value Ref Range    Color Yellow/Straw      Appearance Clear Clear      Specific gravity 1.014 1.003 - 1.030      pH (UA) 6.0 5.0 - 8.0      Protein 30 (A) Negative mg/dL    Glucose Negative Negative mg/dL    Ketone Negative Negative mg/dL    Bilirubin Negative Negative      Blood Moderate (A) Negative      Urobilinogen 0.1 0.1 - 1.0 EU/dL    Nitrites Negative Negative      Leukocyte Esterase Negative Negative      UA:UC IF INDICATED Culture not indicated by UA result      WBC 0-4 0 - 4 /hpf    RBC 5-10 0 - 5 /hpf    Bacteria Negative Negative /hpf    Mucus Trace /lpf   DRUG SCREEN, URINE   Result Value Ref Range    AMPHETAMINES Negative Negative      BARBITURATES Negative Negative      BENZODIAZEPINES Negative Negative      COCAINE Negative Negative      METHADONE Negative Negative      OPIATES Negative Negative      PCP(PHENCYCLIDINE) Negative Negative      THC (TH-CANNABINOL) Negative Negative      Drug screen comment        This test is a screen for drugs of abuse in a medical setting only (i.e., they are unconfirmed results and as such must not be used for non-medical purposes, e.g.,employment testing, legal testing).  Due to its inherent nature, false positive (FP) and false negative (FN) results may be obtained. Therefore, if necessary for medical care, recommend confirmation of positive findings by GC/MS. SARS-COV-2   Result Value Ref Range    SARS-CoV-2 Please find results under separate order     METABOLIC PANEL, BASIC   Result Value Ref Range    Sodium 138 136 - 145 mmol/L    Potassium 3.7 3.5 - 5.1 mmol/L    Chloride 105 97 - 108 mmol/L    CO2 28 21 - 32 mmol/L    Anion gap 5 5 - 15 mmol/L    Glucose 130 (H) 65 - 100 mg/dL    BUN 14 6 - 20 mg/dL    Creatinine 1.38 (H) 0.55 - 1.02 mg/dL    BUN/Creatinine ratio 10 (L) 12 - 20      GFR est AA 54 (L) >60 ml/min/1.73m2    GFR est non-AA 45 (L) >60 ml/min/1.73m2    Calcium 10.0 8.5 - 10.1 mg/dL   T4, FREE   Result Value Ref Range    T4, Free 0.3 (L) 0.8 - 1.5 ng/dL   TSH 3RD GENERATION   Result Value Ref Range    TSH >100.00 (H) 0.36 - 3.74 uIU/mL       Immunizations administered during this encounter: There is no immunization history on file for this patient. Screening for Metabolic Disorders for Patients on Antipsychotic Medications  (Data obtained from the EMR)    Estimated Body Mass Index  Estimated body mass index is 26.34 kg/m² as calculated from the following:    Height as of this encounter: 5' 2\" (1.575 m). Weight as of this encounter: 65.3 kg (144 lb). Vital Signs/Blood Pressure  Visit Vitals  /80   Pulse 82   Temp 98.3 °F (36.8 °C)   Resp 18   Ht 5' 2\" (1.575 m)   Wt 65.3 kg (144 lb)   SpO2 100%   BMI 26.34 kg/m²       Blood Glucose/Hemoglobin A1c  Lab Results   Component Value Date/Time    Glucose 130 (H) 02/02/2021 05:45 AM       No results found for: HBA1C, HGBE8, MPH2GTUB     Lipid Panel  No results found for: CHOL, CHOLX, CHLST, CHOLV, 112521, HDL, HDLP, LDL, LDLC, DLDLP, TGLX, TRIGL, TRIGP, CHHD, CHHDX     Discharge Diagnosis: Depression [F32.9]    Discharge Plan: The patient will be discharged home to her mother's house, where she will reside for a few months with her son before returning to her home.  She will continue to see  at [facility], as well as therapist Keren Vincent moving forward. She was also open to mental health skill building services with [facility], who will be reaching out to her once she is discharged. Discharge Medication List and Instructions:   Current Discharge Medication List      START taking these medications    Details   citalopram (CELEXA) 10 mg tablet Take 1 Tab by mouth daily. Qty: 30 Tab, Refills: 0      haloperidoL (HALDOL) 5 mg tablet Take 1 Tab by mouth nightly. Qty: 30 Tab, Refills: 0      haloperidol decanoate (HALDOL DECANOATE) 50 mg/mL injection 1 mL by IntraMUSCular route every twenty-eight (28) days. Next dose 3/10/2021  Qty: 1 Vial, Refills: 0      levothyroxine (SYNTHROID) 100 mcg tablet Take 1 Tab by mouth every morning. Qty: 30 Tab, Refills: 0      traZODone (DESYREL) 50 mg tablet Take 1 Tab by mouth nightly as needed for Sleep (For insomnia). Qty: 30 Tab, Refills: 0         CONTINUE these medications which have NOT CHANGED    Details   norgestimate-ethinyl estradioL (ORTHO-CYCLEN, SPRINTEC) 0.25-35 mg-mcg tab Take 1 Tab by mouth daily. Qty: 1 Dose Pack, Refills: 14      etonogestrel (IMPLANON) 68 mg impl by SubDERmal route. Associated Diagnoses: Graves' disease; Goiter diffuse         STOP taking these medications       RISPERIDONE (RISPERDAL PO) Comments:   Reason for Stopping:               Unresulted Labs (24h ago, onward)    None        To obtain results of studies pending at discharge, please contact [phone]    Follow-up Information     Follow up With Specialties Details [facility] [address] have an appointment with  on 2/19/21 at 3:15 via telehalth. [address]  [phone]    [address] [place] [address] have an appointment with Keren Vincent on 2/16/21 at 12:15 via telehalth.      [phone] [address] [facility], [address] [place] [address]     Anjum Black MD Family Medicine   [address] [place] [address] [address] [place] [address]  [phone]      [facility]    They offer mental health skill building services, and will be reaching out to you. [address] [place], [address]          Advanced Directive:   Does the patient have an appointed surrogate decision maker? No  Does the patient have a Medical Advance Directive? No  Does the patient have a Psychiatric Advance Directive? No  If the patient does not have a surrogate or Medical Advance Directive AND Psychiatric Advance Directive, the patient was offered information on these advance directives Patient declined to complete    Patient Instructions: Please continue all medications until otherwise directed by physician. Tobacco Cessation Discharge Plan:   Is the patient a smoker and needs referral for smoking cessation? No  Patient referred to the following for smoking cessation with an appointment? No     Patient was offered medication to assist with smoking cessation at discharge? Not applicable  Was education for smoking cessation added to the discharge instructions? Not applicable    Alcohol/Substance Abuse Discharge Plan:   Does the patient have a history of substance/alcohol abuse and requires a referral for treatment? No  Patient referred to the following for substance/alcohol abuse treatment with an appointment? Not applicable  Patient was offered medication to assist with alcohol cessation at discharge? Not applicable  Was education for substance/alcohol abuse added to discharge instructions?  Not applicable    Patient discharged to Home; discussed with patient/caregiver

## 2021-02-11 NOTE — BH NOTES
DAYSHIFT/DISCHARGE:     Patient is up this morning and ate her breakfast in the dayroom. Patient in medication compliant and is pleasant on approach with an overall flat/quiet affect. Patient denies having any depression and or anxiety this morning. Denies SI/HI. Denies AH/VH. Denies feelings of paranoia. Patient states that she feels better and is ready to go home and see her son. Patient received orders for discharge today. Discharge instructions reviewed and understood with the patient and patient verbalized understanding. Prescriptions were sent to [facility] and patient was educated on her medications and instructed on where to go and  her medications. Patient was also reminded about the haldol decconate injection being due in March and the importance of her medications. Patient verbalized understanding. Valuables returned to the patient from the safe and the belongings closet. Patient discharged via her family without any complaints voiced. Patient smiling and happy to be discharging.

## 2021-02-11 NOTE — BH NOTES
CPS REPORT     This writer made a CPS report on the pt.  This writer spoke to [place] and the [facility] hotline, the case number is [id number]

## 2021-02-11 NOTE — PROGRESS NOTES
Problem: Depressed Mood (Adult/Pediatric)  Goal: *STG: Participates in treatment plan  Outcome: Progressing Towards Goal  Goal: *STG: Participates in 1:1 therapy sessions  Outcome: Progressing Towards Goal  Goal: *STG: Verbalizes anger, guilt, and other feelings in a constructive manor  Outcome: Progressing Towards Goal  Goal: *STG: Attends activities and groups  Outcome: Progressing Towards Goal  Goal: *STG: Demonstrates reduction in symptoms and increase in insight into coping skills/future focused  Outcome: Progressing Towards Goal  Goal: *STG: Remains safe in hospital  Outcome: Progressing Towards Goal  Goal: *STG: Complies with medication therapy  Outcome: Progressing Towards Goal  Goal: *LTG: Returns to previous level of functioning and participates with after care plan  Outcome: Progressing Towards Goal  Goal: *LTG: Understands illness and can identify signs of relapse  Outcome: Progressing Towards Goal  Goal: Interventions  Outcome: Progressing Towards Goal     Problem: Patient Education: Go to Patient Education Activity  Goal: Patient/Family Education  Outcome: Progressing Towards Goal     Problem: Anxiety  Goal: *Alleviation of anxiety  Outcome: Progressing Towards Goal  Goal: *Alleviation of anxiety (Palliative Care)  Outcome: Progressing Towards Goal     Problem: Patient Education: Go to Patient Education Activity  Goal: Patient/Family Education  Outcome: Progressing Towards Goal     Problem: Falls - Risk of  Goal: *Absence of Falls  Description: Document Lukas Fall Risk and appropriate interventions in the flowsheet.   Outcome: Progressing Towards Goal  Note: Fall Risk Interventions:                                Problem: Patient Education: Go to Patient Education Activity  Goal: Patient/Family Education  Outcome: Progressing Towards Goal     Problem: Depressed Mood (Adult/Pediatric)  Goal: *STG: Participates in treatment plan  Outcome: Progressing Towards Goal  Goal: *STG: Participates in 1:1 therapy sessions  Outcome: Progressing Towards Goal  Goal: *STG: Verbalizes anger, guilt, and other feelings in a constructive manor  Outcome: Progressing Towards Goal  Goal: *STG: Attends activities and groups  Outcome: Progressing Towards Goal  Goal: *STG: Demonstrates reduction in symptoms and increase in insight into coping skills/future focused  Outcome: Progressing Towards Goal  Goal: *STG: Remains safe in hospital  Outcome: Progressing Towards Goal  Goal: *STG: Complies with medication therapy  Outcome: Progressing Towards Goal  Goal: *LTG: Returns to previous level of functioning and participates with after care plan  Outcome: Progressing Towards Goal  Goal: *LTG: Understands illness and can identify signs of relapse  Outcome: Progressing Towards Goal  Goal: Interventions  Outcome: Progressing Towards Goal     Problem: Patient Education: Go to Patient Education Activity  Goal: Patient/Family Education  Outcome: Progressing Towards Goal     Problem: Anxiety  Goal: *Alleviation of anxiety  Outcome: Progressing Towards Goal  Goal: *Alleviation of anxiety (Palliative Care)  Outcome: Progressing Towards Goal     Problem: Patient Education: Go to Patient Education Activity  Goal: Patient/Family Education  Outcome: Progressing Towards Goal     Problem: Falls - Risk of  Goal: *Absence of Falls  Description: Document Lukas Fall Risk and appropriate interventions in the flowsheet.   Outcome: Progressing Towards Goal  Note: Fall Risk Interventions:                                Problem: Patient Education: Go to Patient Education Activity  Goal: Patient/Family Education  Outcome: Progressing Towards Goal

## 2021-02-11 NOTE — SUICIDE SAFETY PLAN
SAFETY PLAN    A suicide Safety Plan is a document that supports someone when they are having thoughts of suicide. Warning Signs that indicate a suicidal crisis may be developing: What (situations, thoughts, feelings, body sensations, behaviors, etc.) do you experience that lets you know you are beginning to think about suicide? 1. Feeling like someone is watching my every move  2. Thinking my son would be better without me here   3. Not having enough sleep    Internal Coping Strategies:  What things can I do (relaxation techniques, hobbies, physical activities, etc.) to take my mind off my problems without contacting another person? 1. Deep breathing, take a nap  2. Ask for medicaiton  3. Going into the day room    People and social settings that provide distraction: Who can I call or where can I go to distract me? 1. Name: Nursing staff  Phone:   2. Name: Sunil  Phone:    3. Place: Walk the hallways            4. Place: Day ROom    People whom I can ask for help: Who can I call when I need help - for example, friends, family, clergy, someone else? 1. Name: Silvestre Vines                 Phone: [phone]  2. Name: Lavinia  Phone: [phone]  3. Name: Sonya [place]  Phone: [phone]    Professionals or [address] [place] [address] I can contact during a crisis: Who can I call for help - for example, my doctor, my psychiatrist, my psychologist, a mental health provider, a suicide hotline? 1. Clinician Name: [facility]   Phone: [phone]      Clinician Pager or Emergency Contact #:     2. Clinician Name: Assigned  Marya Amezcua)   Phone: [phone]      Clinician Pager or Emergency Contact #:     3. Suicide Prevention Lifeline: 9-514-659-TALK (2666)    4.  [address] [place] [address] Emergency Services -  for example, [facility] suicide hotline, [facility] Hotline: [place] [address]      Emergency Services Address: [facility], Michele Howell [address]      Emergency Services Phone: [phone]    Making the environment safe: How can I make my environment (house/apartment/living space) safer? For example, can I remove guns, medications, and other items? 1. Talk to staff  2.

## 2021-02-11 NOTE — PROGRESS NOTES
Patient alert and oriented x4. Patient denies si/hi/avh. Patient denies anxiety and depression. Patient isolated to room this shift. She has a flat affect and depressed mood. Patient is medication compliant, calm and cooperative. She is lying in bed quietly resting at this time. Will continue to monitor.

## 2021-02-11 NOTE — BH NOTES
DISCHARGE SUMMARY    John Adame  : 1990  MRN: [id number]    The patient Michael Ibanez exhibits the ability to control behavior in a less restrictive environment. Patient's level of functioning is improving. No assaultive/destructive behavior has been observed for the past 24 hours. No suicidal/homicidal threat or behavior has been observed for the past 24 hours. There is no evidence of serious medication side effects. Patient has not been in physical or protective restraints for at least the past 24 hours. If weapons involved, how are they secured? The patient denied access to firearms. Is patient aware of and in agreement with discharge plan? Yes    Arrangements for medication:  Prescriptions given to patient, given a weeks supply or 30 day supply. Copy of discharge instructions to provider?:  Yes     Arrangements for transportation home:  The patient will be picked up by her mother, and will be residing with her for a few months once she is discharged. Keep all follow up appointments as scheduled, continue to take prescribed medications per physician instructions.   Mental health crisis number:  529 or your local mental health crisis line number at [address] [facility] Emergency WARM LINE      [phone] ([phone])      M-F: 9am to 9pm      Sat & Sun: 5pm - 9pm  National suicide prevention lines:                             9-119-IUITTVG ([phone])       4-530-038-TALK ([phone])    Crisis Text Line:  Text HOME to 304272

## 2021-02-11 NOTE — GROUP NOTE
[facility] GROUP DOCUMENTATION INDIVIDUAL Group Therapy Note Date: 2/11/2021 Group Start Time: 2878 Group End Time: 0829 Group Topic: Education Group - Inpatient [facility] Group Pj Gibbons [facility] GROUP DOCUMENTATION GROUP Group Therapy Note Attendees: 5/10 Psych-Ed: Pts were asked how they were feeling as a check in question and to process the Sachi Valdemar that was recently made on the unit. The pts were then walked through the defense mechanisms and encouraged to share how they relate to each defense mechanism. Pts were then encouraged to participate in a breathing exercise facilitated by the writer if they wanted to do so. Pts were then asked to reflect on group Attendance: Attended Patient's Goal:  Pt reported that she was feeling 'hopeful' for the future and said she 'wasn't too worried' about the Sachi Valdemar Interventions/techniques: Validated, Promoted peer support, Provide feedback and Supported Follows Directions: Followed directions Interactions: Interacted appropriately Mental Status: Calm and Flat Behavior/appearance: Attentive, Neatly groomed and Withdrawn/quiet Goals Achieved: Able to engage in interactions, Able to listen to others and Able to reflect/comment on own behavior Additional Notes:  Pt was quiet throughout most of group but said that she 'related to a couple of the defense mechanisms' that were spoken about in group. Pt paid attention to peers and maintained eye contact. Pt participated in the breathing exercise and said it helped her 'feel more relaxed'. Pt is making progress towards goals by attending and participating in group [id number]
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
## 2022-03-19 PROBLEM — N76.0 ACUTE VAGINITIS: Status: ACTIVE | Noted: 2020-08-15

## 2022-03-19 PROBLEM — R87.619 ABNORMAL PAP SMEAR OF CERVIX: Status: ACTIVE | Noted: 2020-08-15

## 2022-03-19 PROBLEM — A59.9 TRICHOMONOSIS: Status: ACTIVE | Noted: 2020-08-15

## 2022-03-19 PROBLEM — F32.A DEPRESSION: Status: ACTIVE | Noted: 2021-02-01

## 2022-10-07 ENCOUNTER — HOSPITAL ENCOUNTER (INPATIENT)
Age: 32
LOS: 5 days | Discharge: HOME | DRG: 750 | End: 2022-10-12
Attending: EMERGENCY MEDICINE | Admitting: PSYCHIATRY & NEUROLOGY
Payer: MEDICAID

## 2022-10-07 DIAGNOSIS — F22 ACUTE PARANOIA (HCC): Primary | ICD-10-CM

## 2022-10-07 PROBLEM — F32.9 MDD (MAJOR DEPRESSIVE DISORDER): Status: ACTIVE | Noted: 2022-10-07

## 2022-10-07 LAB
ALBUMIN SERPL-MCNC: 5 G/DL (ref 3.5–5)
ALBUMIN/GLOB SERPL: 1.4 {RATIO} (ref 1.1–2.2)
ALP SERPL-CCNC: 57 U/L (ref 45–117)
ALT SERPL-CCNC: 41 U/L (ref 12–78)
AMPHET UR QL SCN: NEGATIVE
ANION GAP SERPL CALC-SCNC: 6 MMOL/L (ref 5–15)
APPEARANCE UR: ABNORMAL
AST SERPL W P-5'-P-CCNC: 62 U/L (ref 15–37)
BACTERIA URNS QL MICRO: NEGATIVE /HPF
BARBITURATES UR QL SCN: NEGATIVE
BASOPHILS # BLD: 0.1 K/UL (ref 0–0.1)
BASOPHILS NFR BLD: 1 % (ref 0–1)
BENZODIAZ UR QL: NEGATIVE
BILIRUB SERPL-MCNC: 0.9 MG/DL (ref 0.2–1)
BILIRUB UR QL: NEGATIVE
BUN SERPL-MCNC: 16 MG/DL (ref 6–20)
BUN/CREAT SERPL: 10 (ref 12–20)
CA-I BLD-MCNC: 9.7 MG/DL (ref 8.5–10.1)
CANNABINOIDS UR QL SCN: NEGATIVE
CHLORIDE SERPL-SCNC: 104 MMOL/L (ref 97–108)
CO2 SERPL-SCNC: 26 MMOL/L (ref 21–32)
COCAINE UR QL SCN: NEGATIVE
COLOR UR: YELLOW
CREAT SERPL-MCNC: 1.55 MG/DL (ref 0.55–1.02)
DIFFERENTIAL METHOD BLD: ABNORMAL
DRUG SCRN COMMENT,DRGCM: NORMAL
EOSINOPHIL # BLD: 0.1 K/UL (ref 0–0.4)
EOSINOPHIL NFR BLD: 1 % (ref 0–7)
ERYTHROCYTE [DISTWIDTH] IN BLOOD BY AUTOMATED COUNT: 14.6 % (ref 11.5–14.5)
ETHANOL SERPL-MCNC: <10 MG/DL
FLUAV RNA SPEC QL NAA+PROBE: NOT DETECTED
FLUBV RNA SPEC QL NAA+PROBE: NOT DETECTED
GLOBULIN SER CALC-MCNC: 3.7 G/DL (ref 2–4)
GLUCOSE SERPL-MCNC: 108 MG/DL (ref 65–100)
GLUCOSE UR STRIP.AUTO-MCNC: NEGATIVE MG/DL
HCG UR QL: NEGATIVE
HCT VFR BLD AUTO: 36.3 % (ref 35–47)
HGB BLD-MCNC: 11.8 G/DL (ref 11.5–16)
HGB UR QL STRIP: ABNORMAL
IMM GRANULOCYTES # BLD AUTO: 0.1 K/UL (ref 0–0.04)
IMM GRANULOCYTES NFR BLD AUTO: 1 % (ref 0–0.5)
KETONES UR QL STRIP.AUTO: 5 MG/DL
LEUKOCYTE ESTERASE UR QL STRIP.AUTO: NEGATIVE
LYMPHOCYTES # BLD: 1.7 K/UL (ref 0.8–3.5)
LYMPHOCYTES NFR BLD: 20 % (ref 12–49)
MCH RBC QN AUTO: 32.9 PG (ref 26–34)
MCHC RBC AUTO-ENTMCNC: 32.5 G/DL (ref 30–36.5)
MCV RBC AUTO: 101.1 FL (ref 80–99)
METHADONE UR QL: NEGATIVE
MONOCYTES # BLD: 0.7 K/UL (ref 0–1)
MONOCYTES NFR BLD: 8 % (ref 5–13)
MUCOUS THREADS URNS QL MICRO: ABNORMAL /LPF
NEUTS SEG # BLD: 6 K/UL (ref 1.8–8)
NEUTS SEG NFR BLD: 69 % (ref 32–75)
NITRITE UR QL STRIP.AUTO: NEGATIVE
NRBC # BLD: 0 K/UL (ref 0–0.01)
NRBC BLD-RTO: 0 PER 100 WBC
OPIATES UR QL: NEGATIVE
PCP UR QL: NEGATIVE
PH UR STRIP: 5 [PH] (ref 5–8)
PLATELET # BLD AUTO: 270 K/UL (ref 150–400)
PMV BLD AUTO: 9.9 FL (ref 8.9–12.9)
POTASSIUM SERPL-SCNC: 3.8 MMOL/L (ref 3.5–5.1)
PROT SERPL-MCNC: 8.7 G/DL (ref 6.4–8.2)
PROT UR STRIP-MCNC: 100 MG/DL
RBC # BLD AUTO: 3.59 M/UL (ref 3.8–5.2)
RBC #/AREA URNS HPF: ABNORMAL /HPF (ref 0–5)
SARS-COV-2, COV2: NOT DETECTED
SODIUM SERPL-SCNC: 136 MMOL/L (ref 136–145)
SP GR UR REFRACTOMETRY: >1.03 (ref 1–1.03)
TSH SERPL DL<=0.05 MIU/L-ACNC: >100 UIU/ML (ref 0.36–3.74)
UA: UC IF INDICATED,UAUC: ABNORMAL
UROBILINOGEN UR QL STRIP.AUTO: 2 EU/DL (ref 0.1–1)
WBC # BLD AUTO: 8.6 K/UL (ref 3.6–11)
WBC URNS QL MICRO: ABNORMAL /HPF (ref 0–4)

## 2022-10-07 PROCEDURE — 36415 COLL VENOUS BLD VENIPUNCTURE: CPT

## 2022-10-07 PROCEDURE — 87636 SARSCOV2 & INF A&B AMP PRB: CPT

## 2022-10-07 PROCEDURE — 99285 EMERGENCY DEPT VISIT HI MDM: CPT

## 2022-10-07 PROCEDURE — 74011250637 HC RX REV CODE- 250/637: Performed by: PSYCHIATRY & NEUROLOGY

## 2022-10-07 PROCEDURE — 80053 COMPREHEN METABOLIC PANEL: CPT

## 2022-10-07 PROCEDURE — 81001 URINALYSIS AUTO W/SCOPE: CPT

## 2022-10-07 PROCEDURE — 82077 ASSAY SPEC XCP UR&BREATH IA: CPT

## 2022-10-07 PROCEDURE — 65220000003 HC RM SEMIPRIVATE PSYCH

## 2022-10-07 PROCEDURE — 85025 COMPLETE CBC W/AUTO DIFF WBC: CPT

## 2022-10-07 PROCEDURE — 80307 DRUG TEST PRSMV CHEM ANLYZR: CPT

## 2022-10-07 PROCEDURE — 74011250637 HC RX REV CODE- 250/637: Performed by: NURSE PRACTITIONER

## 2022-10-07 PROCEDURE — 84443 ASSAY THYROID STIM HORMONE: CPT

## 2022-10-07 PROCEDURE — 81025 URINE PREGNANCY TEST: CPT

## 2022-10-07 RX ORDER — LEVOTHYROXINE SODIUM 100 UG/1
100 TABLET ORAL
Status: DISCONTINUED | OUTPATIENT
Start: 2022-10-08 | End: 2022-10-12 | Stop reason: HOSPADM

## 2022-10-07 RX ORDER — DIPHENHYDRAMINE HYDROCHLORIDE 50 MG/ML
50 INJECTION, SOLUTION INTRAMUSCULAR; INTRAVENOUS
Status: DISCONTINUED | OUTPATIENT
Start: 2022-10-07 | End: 2022-10-12 | Stop reason: HOSPADM

## 2022-10-07 RX ORDER — HALOPERIDOL 5 MG/ML
5 INJECTION INTRAMUSCULAR
Status: DISCONTINUED | OUTPATIENT
Start: 2022-10-07 | End: 2022-10-12 | Stop reason: HOSPADM

## 2022-10-07 RX ORDER — BENZTROPINE MESYLATE 1 MG/1
1 TABLET ORAL
Status: DISCONTINUED | OUTPATIENT
Start: 2022-10-07 | End: 2022-10-12 | Stop reason: HOSPADM

## 2022-10-07 RX ORDER — CITALOPRAM 10 MG/1
10 TABLET ORAL DAILY
Status: DISCONTINUED | OUTPATIENT
Start: 2022-10-08 | End: 2022-10-10

## 2022-10-07 RX ORDER — ACETAMINOPHEN 325 MG/1
650 TABLET ORAL
Status: DISCONTINUED | OUTPATIENT
Start: 2022-10-07 | End: 2022-10-12 | Stop reason: HOSPADM

## 2022-10-07 RX ORDER — HALOPERIDOL 5 MG/1
5 TABLET ORAL 2 TIMES DAILY
Status: DISCONTINUED | OUTPATIENT
Start: 2022-10-07 | End: 2022-10-12 | Stop reason: HOSPADM

## 2022-10-07 RX ORDER — TRAZODONE HYDROCHLORIDE 50 MG/1
50 TABLET ORAL
Status: DISCONTINUED | OUTPATIENT
Start: 2022-10-07 | End: 2022-10-12 | Stop reason: HOSPADM

## 2022-10-07 RX ORDER — IBUPROFEN 200 MG
1 TABLET ORAL DAILY
Status: DISCONTINUED | OUTPATIENT
Start: 2022-10-08 | End: 2022-10-12 | Stop reason: HOSPADM

## 2022-10-07 RX ORDER — LEVOTHYROXINE SODIUM 75 UG/1
150 TABLET ORAL
Status: COMPLETED | OUTPATIENT
Start: 2022-10-07 | End: 2022-10-07

## 2022-10-07 RX ORDER — LORAZEPAM 2 MG/ML
1 INJECTION INTRAMUSCULAR
Status: DISCONTINUED | OUTPATIENT
Start: 2022-10-07 | End: 2022-10-12 | Stop reason: HOSPADM

## 2022-10-07 RX ORDER — HYDROXYZINE 50 MG/1
50 TABLET, FILM COATED ORAL
Status: DISCONTINUED | OUTPATIENT
Start: 2022-10-07 | End: 2022-10-12 | Stop reason: HOSPADM

## 2022-10-07 RX ORDER — ADHESIVE BANDAGE
30 BANDAGE TOPICAL DAILY PRN
Status: DISCONTINUED | OUTPATIENT
Start: 2022-10-07 | End: 2022-10-12 | Stop reason: HOSPADM

## 2022-10-07 RX ORDER — OLANZAPINE 5 MG/1
5 TABLET ORAL
Status: DISCONTINUED | OUTPATIENT
Start: 2022-10-07 | End: 2022-10-12 | Stop reason: HOSPADM

## 2022-10-07 RX ADMIN — LEVOTHYROXINE SODIUM 150 MCG: 0.07 TABLET ORAL at 12:48

## 2022-10-07 RX ADMIN — HYDROXYZINE HYDROCHLORIDE 50 MG: 50 TABLET, FILM COATED ORAL at 20:41

## 2022-10-07 RX ADMIN — HALOPERIDOL 5 MG: 5 TABLET ORAL at 20:41
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
## 2022-10-07 NOTE — ED TRIAGE NOTES
\"Checking self in for paranoia, has some set backs, denies si/hi.  Believes someone is \"after me\"
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
## 2022-10-07 NOTE — ED NOTES
28 F pt presents to ED for complaint of Paranoia. Pt oriented to room and changed to low ligature risk hospital gown, urine, blood work and COVID swab collected and sent to lab for analysis, all belongings inventoried and secured, cabinets locked, all trash bins and loose noticeable cables removed for safety, room behavioral health contacted for eval, plan of care reviewed, pt in close proximity to nurse's station with constant observer at bedside for constant 1:1 monitoring, safety maintained.

## 2022-10-07 NOTE — ED PROVIDER NOTES
EMERGENCY DEPARTMENT HISTORY AND PHYSICAL EXAM      Date: 10/7/2022  Patient Name: Shirlyn Primrose    History of Presenting Illness     Chief Complaint   Patient presents with    Mental Health Problem       History Provided By: Patient    HPI: Shirlyn Primrose, 28 y.o. female who denies past medical psych history presents to the ER with a mental health problem. Patient states she needs to check her self in for admission to psych. Patient states that she is feeling depressed somebody is after her. Patient comes in complaining of acute paranoia. Moderate severity, no known exacerbating or relieving factors, no other associated signs and symptoms     There are no other complaints, changes, or physical findings at this time. PCP: Ascencion Nolasco MD    No current facility-administered medications on file prior to encounter. Current Outpatient Medications on File Prior to Encounter   Medication Sig Dispense Refill    citalopram (CELEXA) 10 mg tablet Take 1 Tab by mouth daily. 30 Tab 0    haloperidoL (HALDOL) 5 mg tablet Take 1 Tab by mouth nightly. 30 Tab 0    haloperidol decanoate (HALDOL DECANOATE) 50 mg/mL injection 1 mL by IntraMUSCular route every twenty-eight (28) days. Next dose 3/10/2021 1 Vial 0    levothyroxine (SYNTHROID) 100 mcg tablet Take 1 Tab by mouth every morning. 30 Tab 0    traZODone (DESYREL) 50 mg tablet Take 1 Tab by mouth nightly as needed for Sleep (For insomnia). 30 Tab 0    etonogestrel (IMPLANON) 68 mg impl by SubDERmal route.          Past History     Past Medical History:  Past Medical History:   Diagnosis Date    Abnormal Pap smear of cervix 8/15/2020    Acute vaginitis 8/15/2020    Anxiety     Asthma     Depression     Goiter     Graves disease     Hx of seasonal allergies     Trichomonosis 8/15/2020       Past Surgical History:  Past Surgical History:   Procedure Laterality Date    HX OTHER SURGICAL      nasal polyp 3/22/2013, 2008    HX POLYPECTOMY      nasal polypectomy    HX POLYPECTOMY N/A     Nasal x3       Family History:  Family History   Problem Relation Age of Onset    Hypertension Mother        Social History:  Social History     Tobacco Use    Smoking status: Every Day     Packs/day: 0.25     Years: 2.00     Pack years: 0.50     Types: Cigarettes    Smokeless tobacco: Never   Substance Use Topics    Alcohol use: No     Alcohol/week: 0.8 standard drinks     Types: 1 Glasses of wine per week     Comment: drinks 1 small wine a month    Drug use: No       Allergies: Allergies   Allergen Reactions    Aleve [Naproxen Sodium] Unknown (comments)    Benzocaine Swelling       Review of Systems   Review of Systems   Constitutional:  Negative for chills, fatigue and fever. HENT:  Negative for congestion, sinus pressure and trouble swallowing. Eyes:  Negative for photophobia and pain. Respiratory:  Negative for cough and shortness of breath. Cardiovascular:  Negative for chest pain and leg swelling. Gastrointestinal:  Negative for abdominal pain, diarrhea, nausea and vomiting. Endocrine: Negative for polydipsia, polyphagia and polyuria. Genitourinary:  Negative for decreased urine volume, difficulty urinating, dysuria, hematuria and urgency. Musculoskeletal:  Negative for back pain, gait problem, myalgias and neck pain. Skin:  Negative for pallor and rash. Allergic/Immunologic: Negative for environmental allergies and food allergies. Neurological:  Negative for dizziness, facial asymmetry, speech difficulty, numbness and headaches. Hematological:  Negative for adenopathy. Does not bruise/bleed easily. Psychiatric/Behavioral:  Positive for hallucinations. Negative for agitation, self-injury and suicidal ideas. The patient is not nervous/anxious. Physical Exam   Physical Exam  Vitals and nursing note reviewed. Constitutional:       Appearance: Normal appearance. HENT:      Head: Atraumatic.       Right Ear: Tympanic membrane and external ear normal.      Left Ear: Tympanic membrane and external ear normal.      Nose: Nose normal.      Mouth/Throat:      Mouth: Mucous membranes are moist.   Eyes:      Extraocular Movements: Extraocular movements intact. Pupils: Pupils are equal, round, and reactive to light. Cardiovascular:      Rate and Rhythm: Normal rate and regular rhythm. Pulses: Normal pulses. Heart sounds: Normal heart sounds. Pulmonary:      Breath sounds: Normal breath sounds. Abdominal:      General: Abdomen is flat. Palpations: Abdomen is soft. Musculoskeletal:         General: Normal range of motion. Cervical back: Normal range of motion and neck supple. Skin:     General: Skin is warm and dry. Capillary Refill: Capillary refill takes less than 2 seconds. Neurological:      General: No focal deficit present. Mental Status: She is alert and oriented to person, place, and time. Mental status is at baseline. Psychiatric:         Attention and Perception: Attention normal.         Mood and Affect: Mood is depressed. Behavior: Behavior normal.         Thought Content: Thought content is paranoid. Lab and Diagnostic Study Results   Labs -     No results found for this or any previous visit (from the past 12 hour(s)). Radiologic Studies -   @lastxrresult@  CT Results  (Last 48 hours)      None          CXR Results  (Last 48 hours)      None            Medical Decision Making and ED Course   Differential Diagnosis & Medical Decision Making Provider Note:   Patient presents with a mental health problem. Differential diagnosis include bipolar, schizophrenia, noncompliance, suicidal ideation, homicidal ideation, patient seen by behavioral health. Patient medically cleared. Behavioral health to admit     - I am the first provider for this patient.   I reviewed the vital signs, available nursing notes, past medical history, past surgical history, family history and social history. The patients presenting problems have been discussed, and they are in agreement with the care plan formulated and outlined with them. I have encouraged them to ask questions as they arise throughout their visit. Vital Signs-Reviewed the patient's vital signs. Patient Vitals for the past 12 hrs:   Temp Pulse Resp BP SpO2   10/10/22 1915 98 °F (36.7 °C) 90 18 138/79 99 %       ED Course:          Procedures   Performed by: Rachna Torres NP  Procedures      Disposition   Disposition: Admitted to [facility] the case was discussed with the admitting physician     DISCHARGE PLAN:  1. Current Discharge Medication List        CONTINUE these medications which have NOT CHANGED    Details   citalopram (CELEXA) 10 mg tablet Take 1 Tab by mouth daily. Qty: 30 Tab, Refills: 0      haloperidoL (HALDOL) 5 mg tablet Take 1 Tab by mouth nightly. Qty: 30 Tab, Refills: 0      haloperidol decanoate (HALDOL DECANOATE) 50 mg/mL injection 1 mL by IntraMUSCular route every twenty-eight (28) days. Next dose 3/10/2021  Qty: 1 Vial, Refills: 0      levothyroxine (SYNTHROID) 100 mcg tablet Take 1 Tab by mouth every morning. Qty: 30 Tab, Refills: 0      traZODone (DESYREL) 50 mg tablet Take 1 Tab by mouth nightly as needed for Sleep (For insomnia). Qty: 30 Tab, Refills: 0      etonogestrel (IMPLANON) 68 mg impl by SubDERmal route. Associated Diagnoses: Graves' disease; Goiter diffuse           2. Follow-up Information    None       3. Return to ED if worse   4. Current Discharge Medication List            Diagnosis/Clinical Impression     Clinical Impression:   1. Acute paranoia ([address]        Attestations: Rachna QUILES NP, am the primary clinician of record. Please note that this dictation was completed with [employer], the computer voice recognition software.   Quite often unanticipated grammatical, syntax, homophones, and other interpretive errors are inadvertently transcribed by the computer software. Please disregard these errors. Please excuse any errors that have escaped final proofreading. Thank you.

## 2022-10-07 NOTE — PROGRESS NOTES
Problem: Falls - Risk of  Goal: *Absence of Falls  Description: Document Norman Nicole Fall Risk and appropriate interventions in the flowsheet.   Outcome: Progressing Towards Goal  Note: Fall Risk Interventions:    Medication Interventions: Teach patient to arise slowly   Problem: Paranoid Ideation  Goal: *LTG: Interact with others without defensiveness or anger  Outcome: Progressing Towards Goal  Goal: *STG: Acknowledge that belief about others being threatening is based on more subjective interpretation than on objective data  Outcome: Progressing Towards Goal

## 2022-10-07 NOTE — ED NOTES
TRANSFER - OUT REPORT:    Verbal report given to Swapnil Terry (name) on [facility]  being transferred to  (unit) for routine progression of care       Report consisted of patients Situation, Background, Assessment and   Recommendations(SBAR). Information from the following report(s) SBAR and ED Summary was reviewed with the receiving nurse. Lines:       Opportunity for questions and clarification was provided.       Patient transported with:   TWIN Medrano

## 2022-10-08 PROCEDURE — 74011250637 HC RX REV CODE- 250/637: Performed by: PSYCHIATRY & NEUROLOGY

## 2022-10-08 PROCEDURE — 74011250636 HC RX REV CODE- 250/636: Performed by: PSYCHIATRY & NEUROLOGY

## 2022-10-08 PROCEDURE — 65220000003 HC RM SEMIPRIVATE PSYCH

## 2022-10-08 RX ADMIN — HALOPERIDOL 5 MG: 5 TABLET ORAL at 08:31

## 2022-10-08 RX ADMIN — CITALOPRAM HYDROBROMIDE 10 MG: 10 TABLET ORAL at 08:31

## 2022-10-08 RX ADMIN — HYDROXYZINE HYDROCHLORIDE 50 MG: 50 TABLET, FILM COATED ORAL at 08:32

## 2022-10-08 RX ADMIN — HALOPERIDOL LACTATE 5 MG: 5 INJECTION, SOLUTION INTRAMUSCULAR at 13:15

## 2022-10-08 RX ADMIN — HALOPERIDOL 5 MG: 5 TABLET ORAL at 21:31

## 2022-10-08 RX ADMIN — LORAZEPAM 1 MG: 2 INJECTION INTRAMUSCULAR; INTRAVENOUS at 13:14

## 2022-10-08 RX ADMIN — LEVOTHYROXINE SODIUM 100 MCG: 0.1 TABLET ORAL at 08:31

## 2022-10-08 RX ADMIN — DIPHENHYDRAMINE HYDROCHLORIDE 50 MG: 50 INJECTION, SOLUTION INTRAMUSCULAR; INTRAVENOUS at 13:46
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
## 2022-10-08 NOTE — GROUP NOTE
IP  GROUP DOCUMENTATION INDIVIDUAL                                                                          Group Therapy Note    Date: 10/8/2022    Group Start Time: 7570  Group End Time: 1600  Group Topic: Recreational/Music Therapy    SR[facility] 2  NON ACUTE    Peng Saxena    IP [address] [facility] GROUP DOCUMENTATION GROUP    Group Therapy Note    Facilitated leisure skills group to reinforce positive coping and to manage mood through music, social interaction, group activities and art task    Attendees: 4/10       Attendance: Attended    Patient's Goal:  Attend group daily     Interventions/techniques: Art integration and Supported    Follows Directions: Followed directions    Interactions: Interacted appropriately    Mental Status: Calm    Behavior/appearance: Cooperative    Goals Achieved: Able to engage in interactions and Able to listen to others      Additional Notes:  Receptive to listening to music and a song she selected while working on leisure task.  Interacted with staff when prompted    LOBITO Treviño
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
## 2022-10-08 NOTE — PROGRESS NOTES
Problem: Patient Education: Go to Patient Education Activity  Goal: Patient/Family Education  Outcome: Not Progressing Towards Goal     Problem: Paranoid Ideation  Goal: *STG:Report interacting socially without fear or suspicion  Outcome: Not Progressing Towards Goal     Shift note 7P-7A: Pt presents guarded and paranoid but agreeable to assessment questions. Upon entering room and asking pt her name she states \"Sigrid John. \" This writer verbalized the names of the patients who should be in that room and she responds \"Well okay. \"  When writer inquires about this name she explains that is her aunt and she wants her to be protected as well. Loose association noted, and can be hyper verbal. She endorses SI with no plan but contracted for safety, reports feeling anxious being here. She also endorses HI, no plan, towards someone outside the hospital she would not disclose. Will pass on to oncoming shift. Pt is guarded with initial conversation but later comes up to nurse's station and clearly asks for a room change as her roommate excessively snores. Reviewed bed census and behaviors and placed pt in alternative room which seemed effective. Pt appeared to sleep through the night. Pt's mother, Mady Urbina called x2 requesting information on pt as \"the last time she was there she tried to commit suicide\" but did not have confidentiality code. It was explained in detail by 2 RN's that staff is not at liberty to release any information or provide verification if pt is here or not without confidentiality code, also informed mom of safety rounds, and psych safe unit if pt is here, in addition to phone hours if pt would like to give her a call. No acute distress noted. Will continue to monitor.

## 2022-10-08 NOTE — BH NOTES
PSYCHOSOCIAL ASSESSMENT  :Patient identifying info:   Zahida Downing is a 28 y.o., female admitted 10/7/2022  8:50 AM     Presenting problem and precipitating factors: Pt was admitted voluntarily due to paranoia. Pt stated she felt someone was after her. Pt reports being hospitalized before. Pt reports that she has not been \"on track\" with therapy and medications. Mental status assessment: Pt displayed a flat affect and presented as slightly guarded. Pt endorsed anxiety. Pt denies any SI, HI, AVH. Pt states that she has been drinking alcohol to help her sleep. Pt does appear to have some insight into her mental health. Strengths/Recreation/Coping Skills: Pt reports that she likes her job and loves her son. Collateral information: Pt signed release for her mother, Fadi Palmer    Current psychiatric /substance abuse providers and contact info: Dr. Tona Massey, [place]    Previous psychiatric/substance abuse providers and response to treatment: Pt reports that she has been hospitalized before but does not remember the last time. Family history of mental illness or substance abuse: None reported. Substance abuse history:    Social History     Tobacco Use    Smoking status: Every Day     Packs/day: 0.25     Years: 2.00     Pack years: 0.50     Types: Cigarettes    Smokeless tobacco: Never   Substance Use Topics    Alcohol use: No     Alcohol/week: 0.8 standard drinks     Types: 1 Glasses of wine per week     Comment: drinks 1 small wine a month       History of biomedical complications associated with substance abuse: None reported. Patient's current acceptance of treatment or motivation for change: Pt was able to establish a goal \"Get medications situated. \"    Family constellation: Pt lives with her 8year old son and has family in the area. Is significant other involved? No    Describe support system: Pt considers mom and aunt supportive.      Describe living arrangements and home environment: Pt lives with her [de-identified] year old son. GUARDIAN/POA: NO    Guardian Name: N/A    Guardian Contact: N/A    Health issues:   Hospital Problems  Date Reviewed: 2020            Codes Class Noted POA    MDD (major depressive disorder) ICD-10-CM: F32.9  ICD-9-CM: 296.20  10/7/2022 Unknown           Trauma history: None reported. Legal issues: None    History of  service: No    Financial status: Pt works as . Sikh/cultural factors: None reported. Education/work history: 12th grade    Have you been licensed as a health care professional (current or ): No    Describe coping skills:Pt reports that she enjoys working.      Jairon Jernigan  10/8/2022

## 2022-10-08 NOTE — GROUP NOTE
IP  GROUP DOCUMENTATION INDIVIDUAL                                                                          Group Therapy Note    Date: 10/8/2022    Group Start Time: 1120  Group End Time: 1200  Group Topic: Education Group - Inpatient    [facility] 2  NON ACUTE    Rah Worley    [facility] GROUP DOCUMENTATION GROUP    Group Therapy Note    Facilitated discussion focused on defining different types of defense mechanisms and being able to recognize some defenses that was used to cope with stress and anxiety    Attendees: 5/10       Attendance: Attended    Patient's Goal:  Attend group daily     Interventions/techniques: Informed and Supported    Follows Directions: Followed directions    Interactions: Interacted appropriately    Mental Status: Calm    Behavior/appearance: Cooperative    Goals Achieved: Able to engage in interactions, Able to listen to others, Able to reflect/comment on own behavior, Able to self-disclose, and Discussed coping      Additional Notes:  Receptive to information discussed and engaged.  Was able to share personal example of a defense used prior to admission or in the past such as \"acting out/lashing out and yelling and devaluation/putting herself down\"    Haider Moralez

## 2022-10-09 PROCEDURE — 74011250637 HC RX REV CODE- 250/637: Performed by: PSYCHIATRY & NEUROLOGY

## 2022-10-09 PROCEDURE — 65220000003 HC RM SEMIPRIVATE PSYCH

## 2022-10-09 RX ADMIN — LEVOTHYROXINE SODIUM 100 MCG: 0.1 TABLET ORAL at 06:30

## 2022-10-09 RX ADMIN — CITALOPRAM HYDROBROMIDE 10 MG: 10 TABLET ORAL at 08:36

## 2022-10-09 RX ADMIN — HALOPERIDOL 5 MG: 5 TABLET ORAL at 08:37

## 2022-10-09 NOTE — PROGRESS NOTES
Progress Note  Date:10/9/2022       Room:[facility]  Patient Leda Lyons     YOB: 1990     Age:31 y.o. Subjective    Subjective  She reports she has been feeling better with her paranoia. She states her thoughts as being more clear. She states she is not going to continue the relationship with her significant other but did not elaborate. She states she has been able to sleep. She still presents depressed low mood lack of energy and paranoia. Denies any command hallucinations. Mental status examination-patient with limited eye contact disheveled lethargic tired looking no active command hallucinations. Review of Systems  Objective         Vitals Last 24 Hours:  TEMPERATURE:  Temp  Av.8 °F (37.1 °C)  Min: 98.5 °F (36.9 °C)  Max: 99 °F (37.2 °C)  RESPIRATIONS RANGE: Resp  Av.5  Min: 17  Max: 18  PULSE OXIMETRY RANGE: No data recorded  PULSE RANGE: Pulse  Av.5  Min: 61  Max: 88  BLOOD PRESSURE RANGE: Systolic (03XFA), UJR:226 , Min:120 , SDO:181   ; Diastolic (89SQB), MMY:79, Min:84, Max:87    I/O (24Hr): No intake or output data in the 24 hours ending 10/09/22 1105  Objective:  Vital signs: (most recent): Blood pressure 132/87, pulse 61, temperature 98.5 °F (36.9 °C), resp. rate 17, height 5' 2\" (1.575 m), weight 88.9 kg (196 lb), SpO2 100 %. Labs/Imaging/Diagnostics    Labs:  CBC:  Recent Labs     10/07/22  0941   WBC 8.6   RBC 3.59*   HGB 11.8   HCT 36.3   .1*   RDW 14.6*        CHEMISTRIES:  Recent Labs     10/07/22  0941      K 3.8      CO2 26   BUN 16   CA 9.7   PT/INR:No results for input(s): INR, INREXT in the last 72 hours. No lab exists for component: PROTIME  APTT:No results for input(s): APTT in the last 72 hours.   LIVER PROFILE:  Recent Labs     10/07/22  0941   AST 62*   ALT 41     Lab Results   Component Value Date/Time    ALT (SGPT) 41 10/07/2022 09:41 AM    AST (SGOT) 62 (H) 10/07/2022 09:41 AM    Alk. phosphatase 57 10/07/2022 09:41 AM    Bilirubin, total 0.9 10/07/2022 09:41 AM       Imaging Last 24 Hours:  No results found. Assessment//Plan   Active Problems:    MDD (major depressive disorder) (10/7/2022)      Assessment & Plan  Tolerating her medications  Provided support  Increased participation in therapy  Continue to assess adherence to medications and treatment.     Current Facility-Administered Medications:     OLANZapine (ZyPREXA) tablet 5 mg, 5 mg, Oral, Q6H PRN, Danisha Bergeron MD    benztropine (COGENTIN) tablet 1 mg, 1 mg, Oral, BID PRN, Hanna Gutierrez MD    diphenhydrAMINE (BENADRYL) injection 50 mg, 50 mg, IntraMUSCular, BID PRN, Hanna Gutierrez MD, 50 mg at 10/08/22 1346    hydrOXYzine HCL (ATARAX) tablet 50 mg, 50 mg, Oral, TID PRN, Hanna Gutierrez MD, 50 mg at 10/08/22 5426    LORazepam (ATIVAN) injection 1 mg, 1 mg, IntraMUSCular, Q4H PRN, Hanna Gutierrez MD, 1 mg at 10/08/22 1314    traZODone (DESYREL) tablet 50 mg, 50 mg, Oral, QHS PRN, Hanna Gutierrez MD    acetaminophen (TYLENOL) tablet 650 mg, 650 mg, Oral, Q4H PRN, Danisha Bergeron MD    magnesium hydroxide (MILK OF MAGNESIA) 400 mg/5 mL oral suspension 30 mL, 30 mL, Oral, DAILY PRN, Danisha Bergeron MD    nicotine (NICODERM CQ) 21 mg/24 hr patch 1 Patch, 1 Patch, TransDERmal, DAILY, Danisha Bergeron MD    citalopram (CELEXA) tablet 10 mg, 10 mg, Oral, DAILY, Danisha Bergeron MD, 10 mg at 10/09/22 0836    haloperidol lactate (HALDOL) injection 5 mg, 5 mg, IntraMUSCular, Q6H PRN, Danisha Bergeron MD, 5 mg at 10/08/22 1315    haloperidoL (HALDOL) tablet 5 mg, 5 mg, Oral, BID, Danisha Bergeron MD, 5 mg at 10/09/22 0837    levothyroxine (SYNTHROID) tablet 100 mcg, 100 mcg, Oral, 6am, Richie Bergeron MD, 100 mcg at 10/09/22 0630   Electronically signed by Brittny Soler MD on 10/9/2022 at 11:05 AM

## 2022-10-09 NOTE — GROUP NOTE
IP  GROUP DOCUMENTATION INDIVIDUAL                                                                          Group Therapy Note    Date: 10/9/2022    Group Start Time: 4802  Group End Time: 2350  Group Topic: Recreational/Music Therapy    SRM 2  NON ACUTE    Lito Nett    IP [address] [facility] GROUP DOCUMENTATION GROUP    Group Therapy Note    Facilitated leisure skills group to reinforce positive coping and to manage mood through music, social interaction, group activities and art task    Attendees: 7/13       Attendance: Did not attend    Additional Notes:  Encouraged but did not attend    LOBITO Griffin

## 2022-10-09 NOTE — BH NOTES
Pt. Is flat and withdrawn. Spoke with her mother on the phone several times. Pt. Denied depression, anxiety, SI/HI, hallucinations or pain. Will continue to round closely Q15 mins per unit protocol. Pt. Konrad Suh at the time of this note.

## 2022-10-09 NOTE — PROGRESS NOTES
Problem: Paranoid Ideation  Goal: *STG: Identify core belief that others are untrustworthy & malicious  Outcome: Progressing Towards Goal     Problem: Paranoid Ideation  Goal: *STG: Identify historical sources of feelings of vulnerability  Outcome: Progressing Towards Goal     Problem: Paranoid Ideation  Goal: *STG: Verbalize trust of significant other & feel relaxed when not in his/her presence  Outcome: Progressing Towards Goal     Problem: Paranoid Ideation  Goal: *STG:Report interacting socially without fear or suspicion  Outcome: Progressing Towards Goal

## 2022-10-09 NOTE — BH NOTES
Alert, oriented to person and place. Affect and mood are flat and depressed. Denies SI/HI at this time. Medication and meal compliant. Attended group this am, encouraged to increase interaction it peers on unit.

## 2022-10-09 NOTE — GROUP NOTE
IP  GROUP DOCUMENTATION INDIVIDUAL                                                                          Group Therapy Note    Date: 10/9/2022    Group Start Time: 1120  Group End Time: 1200  Group Topic: Education Group - Inpatient    SRM 2  NON ACUTE    Sherryle Frames    IP [address] [facility] GROUP DOCUMENTATION GROUP    Group Therapy Note    Facilitated discussion focus on understanding and developing healthy boundaries to improve relationships    Attendees: 5/13       Attendance: Attended    Patient's Goal: Attend group daily     Interventions/techniques: Informed and Supported    Follows Directions:  Followed directions    Interactions: Interacted appropriately    Mental Status: Calm    Behavior/appearance: Cooperative and Needed prompting    Goals Achieved: Able to engage in interactions, Able to listen to others, Able to reflect/comment on own behavior, and Able to experience relief/decrease in symptoms      Additional Notes:  Receptive to information discussed on healthy and unhealthy boundaries and was able to share some of them that applied to her such as \"sometimes unlikely to ask for help\"    Marleni Falcon, [address]

## 2022-10-10 PROCEDURE — 74011250637 HC RX REV CODE- 250/637: Performed by: PSYCHIATRY & NEUROLOGY

## 2022-10-10 PROCEDURE — 65220000003 HC RM SEMIPRIVATE PSYCH

## 2022-10-10 RX ORDER — CITALOPRAM 20 MG/1
20 TABLET, FILM COATED ORAL DAILY
Status: DISCONTINUED | OUTPATIENT
Start: 2022-10-11 | End: 2022-10-12 | Stop reason: HOSPADM

## 2022-10-10 RX ADMIN — CITALOPRAM HYDROBROMIDE 10 MG: 10 TABLET ORAL at 08:55

## 2022-10-10 RX ADMIN — HALOPERIDOL 5 MG: 5 TABLET ORAL at 20:17

## 2022-10-10 RX ADMIN — HALOPERIDOL 5 MG: 5 TABLET ORAL at 08:55

## 2022-10-10 NOTE — MED STUDENT NOTES
History and Physical    NAME: Elder Page   :  1990   MRN:  [id number]     Date/Time:  10/10/2022 8:06 AM    Patient PCP: Marquez Alexandre MD  ______________________________________________________________________             Subjective:     CHIEF COMPLAINT: Mental health problem        HISTORY OF PRESENT ILLNESS:       Patient is a 28y.o. year old female with a past medical history of depression, anxiety, Graves disease, and acquired hypothyroidism who presents to the ER with paranoia and anxiety. Patient denies hot or cold intolerance and polydipsia. Patient confirms chest pain yesterday due to anxiety. Patient confirms polyuria yesterday by urinating 2-3 times last night. Patient denies pain or burning with urination, hematuria, dyspnea, fatigue, weight changes, or fever.     PMH: Graves disease, depression, anxiety, asthma, acquired hypothyroidism    PSH: [place] I-131 thyroid therapy in     [address]: smokes 2-4 cigarettes daily, drinks 1/2-1 cup tequila before bed most days, denies illicit drug use    Allergies: benzocaine - facial swelling    Labs:  High TSH (>100)  High AST (62)  Awaiting free T3 and T4 results  Low GFR (45)  Urinalysis high in protein (100)  High creatinine (1.55)    Past Medical History:   Diagnosis Date    Abnormal Pap smear of cervix 8/15/2020    Acute vaginitis 8/15/2020    Anxiety     Asthma     Depression     Goiter     Graves disease     Hx of seasonal allergies     Trichomonosis 8/15/2020        Past Surgical History:   Procedure Laterality Date    HX OTHER SURGICAL      nasal polyp 3/22/2013,     HX POLYPECTOMY      nasal polypectomy    HX POLYPECTOMY N/A     Nasal x3       Social History     Tobacco Use    Smoking status: Every Day     Packs/day: 0.25     Years: 2.00     Pack years: 0.50     Types: Cigarettes    Smokeless tobacco: Never   Substance Use Topics    Alcohol use: No     Alcohol/week: 0.8 standard drinks     Types: 1 Glasses of wine per week     Comment: drinks 1 small wine a month        Family History   Problem Relation Age of Onset    Hypertension Mother        Allergies   Allergen Reactions    Aleve [Naproxen Sodium] Unknown (comments)    Benzocaine Swelling        Prior to Admission medications    Medication Sig Start Date End Date Taking? Authorizing Provider   citalopram (CELEXA) 10 mg tablet Take 1 Tab by mouth daily. 2/12/21   Olman Rand MD   haloperidoL (HALDOL) 5 mg tablet Take 1 Tab by mouth nightly. 2/11/21   Olman Rand MD   haloperidol decanoate (HALDOL DECANOATE) 50 mg/mL injection 1 mL by IntraMUSCular route every twenty-eight (28) days. Next dose 3/10/2021 3/10/21   Olman Rand MD   levothyroxine (SYNTHROID) 100 mcg tablet Take 1 Tab by mouth every morning. 2/12/21   Olman Rand MD   traZODone (DESYREL) 50 mg tablet Take 1 Tab by mouth nightly as needed for Sleep (For insomnia). 2/11/21   Olman Rand MD   etonogestrel (IMPLANON) 68 mg impl by SubDERmal route.     Provider, Historical         Current Facility-Administered Medications:     OLANZapine (ZyPREXA) tablet 5 mg, 5 mg, Oral, Q6H PRN, Danisha Bergeron MD    benztropine (COGENTIN) tablet 1 mg, 1 mg, Oral, BID PRN, Olman Rand MD    diphenhydrAMINE (BENADRYL) injection 50 mg, 50 mg, IntraMUSCular, BID PRN, Olman Rand MD, 50 mg at 10/08/22 1346    hydrOXYzine HCL (ATARAX) tablet 50 mg, 50 mg, Oral, TID PRN, Olman Rand MD, 50 mg at 10/08/22 3824    LORazepam (ATIVAN) injection 1 mg, 1 mg, IntraMUSCular, Q4H PRN, Olman Rand MD, 1 mg at 10/08/22 1314    traZODone (DESYREL) tablet 50 mg, 50 mg, Oral, QHS PRN, Olman Rand MD    acetaminophen (TYLENOL) tablet 650 mg, 650 mg, Oral, Q4H PRN, Danisha Bergeron MD    magnesium hydroxide (MILK OF MAGNESIA) 400 mg/5 mL oral suspension 30 mL, 30 mL, Oral, DAILY PRN, Danisha Bergeron MD    nicotine (NICODERM CQ) 21 mg/24 hr patch 1 Patch, 1 Patch, TransDERmal, DAILY, Danisha Bergeron MD    citalopram (CELEXA) tablet 10 mg, 10 mg, Oral, DAILY, Danisha Bergeron MD, 10 mg at 10/09/22 5241 haloperidol lactate (HALDOL) injection 5 mg, 5 mg, IntraMUSCular, Q6H PRN, Danisha Bergeron MD, 5 mg at 10/08/22 1315    haloperidoL (HALDOL) tablet 5 mg, 5 mg, Oral, BID, Danisha Bergeron MD, 5 mg at 10/09/22 6236    levothyroxine (SYNTHROID) tablet 100 mcg, 100 mcg, Oral, 6am, Danisha Bergeron MD, 100 mcg at 10/09/22 0630    LAB DATA REVIEWED:    No results found for this or any previous visit (from the past 24 hour(s)). XR Results (most recent):  No results found for this or any previous visit. No orders to display        Review of Systems:  Constitutional: Negative for chills and fever. HENT: Negative. Eyes: Negative. Respiratory: Negative. Cardiovascular: Negative. Gastrointestinal: Negative for abdominal pain and nausea. Skin: Negative. Neurological: Negative. Objective:   VITALS:    Visit Vitals  /77   Pulse 94   Temp 97.9 °F (36.6 °C)   Resp 16   Ht 5' 2\" (1.575 m)   Wt 88.9 kg (196 lb)   SpO2 100%   BMI 35.85 kg/m²       Physical Exam:   Constitutional: pt is oriented to person, place, and time. HENT:   Head: Normocephalic and atraumatic. Eyes: Pupils are equal, round, and reactive to light. EOM are normal.   Cardiovascular: Normal rate, regular rhythm and normal heart sounds. Pulmonary/Chest: Breath sounds normal. No wheezes. No rales. Exhibits no tenderness. Abdominal: Soft. Bowel sounds are normal. There is no abdominal tenderness. There is no rebound and no guarding. Musculoskeletal: Normal range of motion. Neurological: pt is alert and oriented to person, place, and time. Alert. Normal strength. No cranial nerve deficit or sensory deficit. Displays a negative Romberg sign.         ASSESSMENT:  Anxiety  Depression  History of Graves Disease  Acquired hypothyroidism  History of Asthma  Chronic kidney disease    PLAN:  Continue current medication        ________________________________________________________________________    Signed: Delene [place]   *ATTENTION: This note has been created by a medical student for educational purposes only. Please do not refer to the content of this note for clinical decision-making, billing, or other purposes. Please see attending physicians note to obtain clinical information on this patient. *

## 2022-10-10 NOTE — GROUP NOTE
IP  GROUP DOCUMENTATION INDIVIDUAL                                                                          Group Therapy Note    Date: 10/10/2022    Group Start Time: 7639  Group End Time: 2374  Group Topic: Education Group - Inpatient    SRM 2  NON ACUTE    Randy Seip    IP [address] [facility] GROUP DOCUMENTATION GROUP    Group Therapy Note    Facilitated group to introduce and discuss information relating to body image/how one think and feel about their appearance and how one feel in their own skin    Attendees: 7/13       Attendance: Attended    Patient's Goal:  Attend group daily     Interventions/techniques: Informed and Supported    Follows Directions: Followed directions    Interactions: Interacted appropriately    Mental Status: Calm    Behavior/appearance: Cooperative    Goals Achieved: Able to engage in interactions, Able to listen to others, Able to reflect/comment on own behavior, Able to self-disclose, Discussed coping, and Discussed self-esteem issues      Additional Notes:  Receptive to information discussed and engaged.  Pt was able to share something positive about herself and a positive way to help boost  her self-esteem such as \"getting her hair done\"    Pérez [facility]

## 2022-10-10 NOTE — GROUP NOTE
[facility] GROUP DOCUMENTATION INDIVIDUAL                                                                          Group Therapy Note    Date: 10/10/2022    Group Start Time: 8044  Group End Time: 1400  Group Topic: Process Group - Inpatient    SRM 2 [facility] ACUTE    Mar Brady    IP [address] [facility] GROUP DOCUMENTATION GROUP    Group Therapy Note: Facilitator posed process activity: Have patients think about the animal they see themselves as. Ask them why they think this animal represents them. Ask them positive and negative attributes from this animal. Then ask patients what animals they think other people see them as and discuss differences. Attendees: 5       Attendance: Attended    Patient's Goal:  To attend groups    Interventions/techniques: Informed, Validated, and Supported    Follows Directions: Followed directions    Interactions: Interacted appropriately    Mental Status: Calm and Congruent    Behavior/appearance: Attentive and Cooperative    Goals Achieved: Able to engage in interactions, Able to listen to others, Able to give feedback to another, Able to self-disclose, and Discussed coping      Additional Notes:  Pt chose the jamila as her spirit animal, representing power of leadership and ability to lead without insisting others follow.      Patrick Flaherty

## 2022-10-10 NOTE — GROUP NOTE
IP  GROUP DOCUMENTATION INDIVIDUAL                                                                          Group Therapy Note    Date: 10/10/2022    Group Start Time: 8156  Group End Time: 4352  Group Topic: Recreational/Music Therapy    SRM 2  NON ACUTE    Randy Seip    IP [address] [facility] GROUP DOCUMENTATION GROUP    Group Therapy Note    Facilitated leisure skills group to reinforce positive coping and to manage mood through music, social interaction, group activities and art task    Attendees: 6/11       Attendance: Did not attend    Additional Notes:  Encouraged but did not attend    MIN EdenS

## 2022-10-10 NOTE — BH NOTES
Behavioral Health Treatment Team Note     Patient goal(s) for today: 'go to groups'  Treatment team focus/goals: continue medication management, group therapy, maintain ADLS, work on safe discharge plan    Progress note: pt presents with a flat affect and congruent mood. Pt denies SI/HI/AVH at this time. Pt is neatly groomed, oriented x3. Pt does appear to be thought blocking intermittently. Pt and writer discussed her previous relationship and how that impacted her mental health. Writer discussed php with pt and pt said she would think about it. Writer explained importance of prioritizing our mental health and taking care of ourselves. Pt identified her family support and said that she is feeling better since being on her medication. Pt still needs an inpatient level of care in order to coordinate a safe discharge plan.     LOS:  3  Expected LOS: 5-7 days     Insurance info/prescription coverage:  [employer]   Date of last family contact:  10.8.22  Family requesting physician contact today:  no  Discharge plan:  will return home with outpatient services coordinated   Guns in the home:  no   Outpatient provider(s):  [address] [place] [address] Psychiatric    Participating treatment team members: Elder Page, * (assigned SW), [facility], [facility]

## 2022-10-10 NOTE — BH NOTES
Pt. Is lying quietly in bed at present ,denies feeling suicidal .paranoid,guarded,insight and judgement is limited,mood remains depressed,no energy, lack of motivation. denies hallucinations at present,no other concerns voiced, remains on close observation.

## 2022-10-11 PROCEDURE — 74011250637 HC RX REV CODE- 250/637: Performed by: PSYCHIATRY & NEUROLOGY

## 2022-10-11 PROCEDURE — 65220000003 HC RM SEMIPRIVATE PSYCH

## 2022-10-11 RX ADMIN — CITALOPRAM HYDROBROMIDE 20 MG: 20 TABLET ORAL at 09:02

## 2022-10-11 RX ADMIN — HALOPERIDOL 5 MG: 5 TABLET ORAL at 09:02

## 2022-10-11 RX ADMIN — HALOPERIDOL 5 MG: 5 TABLET ORAL at 21:26

## 2022-10-11 RX ADMIN — LEVOTHYROXINE SODIUM 100 MCG: 0.1 TABLET ORAL at 06:28

## 2022-10-11 RX ADMIN — TRAZODONE HYDROCHLORIDE 50 MG: 50 TABLET ORAL at 21:26

## 2022-10-11 NOTE — BH NOTES
DAYSHIFT NOTE:     Patient laying in the bed this morning when this writer approached her. Patient is pleasant and soft spoken. Patient has minimal interactions. Patient does get up for her meals. Patient is medication compliant. Patient denies having any depression and or anxiety. Denies SI/HI. Denies AH/VH. Patient is up on the phone at times throughout the day. Patient asked for hygiene supplies today as well. Attends groups. Close observations continued to ensure patient safety.

## 2022-10-11 NOTE — GROUP NOTE
IP  GROUP DOCUMENTATION INDIVIDUAL                                                                          Group Therapy Note    Date: 10/11/2022    Group Start Time: 1525  Group End Time: 4165  Group Topic: Recreational/Music Therapy    SRM 2 BH NON ACUTE    Harris Timoteo    IP 1150 [facility] GROUP DOCUMENTATION GROUP    Group Therapy Note    Facilitated leisure skills group to reinforce positive coping and to manage mood through music, social interaction, group activities and art task    Attendees: 7/12       Attendance: Attended    Patient's Goal:  Attend group daily    Interventions/techniques: Art integration and Supported    Follows Directions: Followed directions    Interactions: Interacted appropriately    Mental Status: Calm    Behavior/appearance: Cooperative    Goals Achieved: Able to engage in interactions and Able to listen to others      Additional Notes: Receptive to listening to music. Interacted with staff when prompted.  Declined to work on leisure task     Yoni Cornell, [address]

## 2022-10-11 NOTE — BH NOTES
Behavioral Health Treatment Team Note     Patient goal(s) for today: 'go to groups'  Treatment team focus/goals: continue medication management, group therapy, maintain ADLS, work on safe discharge plan    Progress note: Pt presents with a flat affect and congruent mood. Pt denies SI/HI/AVH at this time. Pt is neatly groomed, oriented x4. Pt reports that she feels better and is hoping to go home tomorrow. Pt said that has been going to groups and speaking with her family. Pt reports that she sees [facility] and enjoys going to them. Pt reports she feels a lot better. Pt still needs an inpatient level of care in order to coordinate a safe discharge plan.     LOS:  4  Expected LOS: 5-7 days     Insurance info/prescription coverage:  [employer]   Date of last family contact:  10.8.22  Family requesting physician contact today:  no  Discharge plan:  will return home with outpatient services coordinated   Guns in the home:  no   Outpatient provider(s):  [facility]    Participating treatment team members: Aj Colin, * (assigned SW), Ajit Modi, MSW

## 2022-10-11 NOTE — PROGRESS NOTES
Progress Note  Date:10/11/2022       Room:[facility]  Patient Shannon Lyons     YOB: 1990     Age:32 y.o. Subjective    Subjective   Patient reports feeling slightly better with her depression and still isolated to herself and withdrawn. Lack of energy and motivation. She denies having any auditory or visual hallucinations. She states she is able to sleep through the night. She still presents paranoid. Mental status examination-patient is slightly better with her affect. No active SI or HI voiced. Denies any command hallucinations. Decrease in the paranoia. Insight judgment coping remains limited. Review of Systems  Objective         Vitals Last 24 Hours:  TEMPERATURE:  Temp  Av.2 °F (36.8 °C)  Min: 98 °F (36.7 °C)  Max: 98.3 °F (36.8 °C)  RESPIRATIONS RANGE: Resp  Av  Min: 18  Max: 18  PULSE OXIMETRY RANGE: SpO2  Av %  Min: 99 %  Max: 99 %  PULSE RANGE: Pulse  Av  Min: 88  Max: 90  BLOOD PRESSURE RANGE: Systolic (93ZPB), UBC:668 , Min:110 , DJV:369   ; Diastolic (14IPK), WVF:32, Min:76, Max:79    I/O (24Hr): No intake or output data in the 24 hours ending 10/11/22 0015  Objective:  Vital signs: (most recent): Blood pressure 138/79, pulse 90, temperature 98 °F (36.7 °C), resp. rate 18, height 5' 2\" (1.575 m), weight 88.9 kg (196 lb), SpO2 99 %. Labs/Imaging/Diagnostics    Labs:  CBC:No results for input(s): WBC, RBC, HGB, HCT, MCV, RDW, PLT, HGBEXT, HCTEXT, PLTEXT in the last 72 hours. CHEMISTRIES:No results for input(s): NA, K, CL, CO2, BUN, CA, PHOS, MG in the last 72 hours. No lab exists for component: CREATININE, GLUCOSEPT/INR:No results for input(s): INR, INREXT in the last 72 hours. No lab exists for component: PROTIME  APTT:No results for input(s): APTT in the last 72 hours. LIVER PROFILE:No results for input(s): AST, ALT in the last 72 hours.     No lab exists for component: Mis Bernabe, [facility]  Lab Results Component Value Date/Time    ALT (SGPT) 41 10/07/2022 09:41 AM    AST (SGOT) 62 (H) 10/07/2022 09:41 AM    Alk. phosphatase 57 10/07/2022 09:41 AM    Bilirubin, total 0.9 10/07/2022 09:41 AM       Imaging Last 24 Hours:  No results found.   Assessment//Plan   Active Problems:    MDD (major depressive disorder) (10/7/2022)      Assessment & Plan  Provided support and psychoeducation  No dystonic symptoms noted  Continue current medications  Family meeting  Discharge planning in 1 to 2 days        Current Facility-Administered Medications:     citalopram (CELEXA) tablet 20 mg, 20 mg, Oral, DAILY, Danisha Bergeron MD    OLANZapine (ZyPREXA) tablet 5 mg, 5 mg, Oral, Q6H PRN, Danisha Bergeron MD    benztropine (COGENTIN) tablet 1 mg, 1 mg, Oral, BID PRN, Katharine Malone MD    diphenhydrAMINE (BENADRYL) injection 50 mg, 50 mg, IntraMUSCular, BID PRN, Katharine Malone MD, 50 mg at 10/08/22 1346    hydrOXYzine HCL (ATARAX) tablet 50 mg, 50 mg, Oral, TID PRN, Katharine Malone MD, 50 mg at 10/08/22 0832    LORazepam (ATIVAN) injection 1 mg, 1 mg, IntraMUSCular, Q4H PRN, Katharine Malone MD, 1 mg at 10/08/22 1314    traZODone (DESYREL) tablet 50 mg, 50 mg, Oral, QHS PRN, Katharine Malone MD    acetaminophen (TYLENOL) tablet 650 mg, 650 mg, Oral, Q4H PRN, Danisha Bergeron MD    magnesium hydroxide (MILK OF MAGNESIA) 400 mg/5 mL oral suspension 30 mL, 30 mL, Oral, DAILY PRN, Danisha Bergeron MD    nicotine (NICODERM CQ) 21 mg/24 hr patch 1 Patch, 1 Patch, TransDERmal, DAILY, Danisha Bergeron MD    haloperidol lactate (HALDOL) injection 5 mg, 5 mg, IntraMUSCular, Q6H PRN, Danisha Bergeron MD, 5 mg at 10/08/22 1315    haloperidoL (HALDOL) tablet 5 mg, 5 mg, Oral, BID, Danisha Bergeron MD, 5 mg at 10/10/22 2017    levothyroxine (SYNTHROID) tablet 100 mcg, 100 mcg, Oral, 6am, Criss Bergeron MD, 100 mcg at 10/09/22 0630   Electronically signed by Adolfo Fischer MD on 10/11/2022 at 12:15 AM

## 2022-10-11 NOTE — GROUP NOTE
IP  GROUP DOCUMENTATION INDIVIDUAL                                                                          Group Therapy Note    Date: 10/11/2022    Group Start Time: 1120  Group End Time: 9750  Group Topic: Education Group - Inpatient    SR[facility] 2 [facility] NON ACUTE    [facility]    IP [address] [facility] GROUP DOCUMENTATION GROUP    Group Therapy Note    Facilitated group to discuss definition and examples of healthy vs unhealthy coping strategies. Introduced examples of unhealthy scenarios to help identify consequences from using unhealthy strategies and recognizing healthy coping strategies that would be helpful and identifying barriers that may prevent using healthy coping strategies    Attendees: 7/11       Attendance: Attended    Patient's Goal:  Attend group daily     Interventions/techniques: Informed and Supported    Follows Directions: Followed directions    Interactions: Interacted appropriately    Mental Status: Calm    Behavior/appearance: Cooperative    Goals Achieved: Able to engage in interactions, Able to listen to others, Able to reflect/comment on own behavior, Able to self-disclose, and Discussed coping      Additional Notes:  Receptive to information discussed and engaged.  Pt was able to recognize examples and shared a healthy coping strategy she will use in the future such as \"seeking professional help and relaxation technique/deep breathing\"    Radha Eller

## 2022-10-11 NOTE — GROUP NOTE
[facility]  GROUP DOCUMENTATION INDIVIDUAL                                                                          Group Therapy Note    Date: 10/11/2022    Group Start Time: 6972  Group End Time: 8328  Group Topic: Process Group - Inpatient    SR[facility] 2 [facility] ACUTE    Crystal Lloyd    IP [address] [facility] GROUP DOCUMENTATION GROUP    Group Therapy Note  Process group was focused on goal setting and accomplishments. Pts shared goals that they have achieved, working on currently and future goals. Pts interacted well and provided positive feedback to others. They spoke about why it is important to make goals and the barriers/difficulties in reaching the goals. Group ended by each pt sharing one positive quality in themselves that will help them accomplish their goals. Attendees: 7-11       Attendance: Attended    Patient's Goal:  To attend group and participate in activities     Interventions/techniques: Validated, Provide feedback, and Supported    Follows Directions: Followed directions    Interactions: Interacted appropriately    Mental Status: Flat    Behavior/appearance: Attentive, Cooperative, Motivated, and Neatly groomed    Goals Achieved: Able to engage in interactions, Able to listen to others, Able to give feedback to another, Able to reflect/comment on own behavior, Able to receive feedback, Able to self-disclose, Discussed discharge plans, Identified feelings, and Identified relapse prevention strategies      Additional Notes:  Pt shared that \"an on going/life long goal is to work on her mental health\". She said that she understands that is not a goal that she can accomplish.  Pt was able to process that and changed her goal to, continue with medication and follow up with care to stay out of the hospital.     [facility]

## 2022-10-11 NOTE — PROGRESS NOTES
Late entry:  Problem: Falls - Risk of  Goal: *Absence of Falls  Description: Document Cresencio Hull Fall Risk and appropriate interventions in the flowsheet. Outcome: Progressing Towards Goal  Note: Fall Risk Interventions:     Medication Interventions: Teach patient to arise slowly    -no falls reported or noted. Pt isolates to room - sleeping; appears withdrawn; offered a blanket, looked at the writer, and laid her head back down. Medication not given - pt sleeping; no s/s of discomfort noted. Respirations are quiet and unlabored. Pt remains on close observation for safety.

## 2022-10-11 NOTE — PROGRESS NOTES
Problem: Paranoid Ideation  Goal: *STG: Acknowledge that belief about others being threatening is based on more subjective interpretation than on objective data  Outcome: Progressing Towards Goal  Goal: *STG: Stop being accusatory of others  Outcome: Progressing Towards Goal     BH Shift Note:    Ms. Meryle Liming was alert and oriented times 4 at the onset of the shift in bed resting. She presented her thoughts clearly and coherently. She denied thoughts to harm herself or others. She was compliant with her medications and is currently in bed with her eyes closed with even and unlabored breathing. Staff will continue to monitor on every 15 minute checks.

## 2022-10-12 VITALS
SYSTOLIC BLOOD PRESSURE: 127 MMHG | BODY MASS INDEX: 36.07 KG/M2 | RESPIRATION RATE: 18 BRPM | HEART RATE: 88 BPM | DIASTOLIC BLOOD PRESSURE: 81 MMHG | OXYGEN SATURATION: 100 % | WEIGHT: 196 LBS | TEMPERATURE: 99 F | HEIGHT: 62 IN

## 2022-10-12 PROCEDURE — 74011250637 HC RX REV CODE- 250/637: Performed by: PSYCHIATRY & NEUROLOGY

## 2022-10-12 RX ORDER — TRAZODONE HYDROCHLORIDE 50 MG/1
50 TABLET ORAL
Qty: 30 TABLET | Refills: 1 | Status: SHIPPED | OUTPATIENT
Start: 2022-10-12

## 2022-10-12 RX ORDER — LEVOTHYROXINE SODIUM 100 UG/1
100 TABLET ORAL
Qty: 30 TABLET | Refills: 1 | Status: SHIPPED | OUTPATIENT
Start: 2022-10-13

## 2022-10-12 RX ORDER — CITALOPRAM 20 MG/1
20 TABLET, FILM COATED ORAL DAILY
Qty: 30 TABLET | Refills: 1 | Status: SHIPPED | OUTPATIENT
Start: 2022-10-13

## 2022-10-12 RX ORDER — HALOPERIDOL 5 MG/1
5 TABLET ORAL 2 TIMES DAILY
Qty: 60 TABLET | Refills: 1 | Status: SHIPPED | OUTPATIENT
Start: 2022-10-12

## 2022-10-12 RX ADMIN — HALOPERIDOL 5 MG: 5 TABLET ORAL at 08:53

## 2022-10-12 RX ADMIN — CITALOPRAM HYDROBROMIDE 20 MG: 20 TABLET ORAL at 08:53

## 2022-10-12 RX ADMIN — LEVOTHYROXINE SODIUM 100 MCG: 0.1 TABLET ORAL at 06:19

## 2022-10-12 NOTE — BH NOTES
Nursing Note    Patient is alert and oriented x 3. She denies any SI/HI/AH/VH. Patient denies any anxiety or depression. Restricted affect and isolated mood. Patient remained in bed for the majority of the shift. She voiced no complaints and accepted her medications without difficulty but seemed confused about what her medications were because she mentioned Risperdal and Melatonin instead of Haldol and Trazodone. Staff will continue to monitor patient for safety.

## 2022-10-12 NOTE — GROUP NOTE
[facility]  GROUP DOCUMENTATION INDIVIDUAL                                                                          Group Therapy Note    Date: 10/12/2022    Group Start Time: 1115  Group End Time: 1150  Group Topic: Education Group - Inpatient    SR[facility] 2 [facility] TH ACUTE    [facility]og[facility] [address] GROUP DOCUMENTATION GROUP    Group Therapy Note  Group was focused on safety planning and the importance of having one. Writer walked the pts through each step and answered questions. Pts interacted well and shared their different triggers and coping skills. One pt mentioned that he feels left out, which brought up the topic of safety plans being personal. Brittany Mercedes acknowledged their concerns and assured them that sharing is optional. She also informed pts that sometimes sharing things helps others think of their own triggers or coping skills. Attendees: 8-13       Attendance: Attended    Patient's Goal:  To attend and participate in activities     Interventions/techniques: Informed, Validated, and Provide feedback    Follows Directions: Followed directions    Interactions: Interacted appropriately    Mental Status: Calm    Behavior/appearance: Attentive, Cooperative, Motivated, and Neatly groomed    Goals Achieved: Able to engage in interactions, Able to listen to others, Able to give feedback to another, Able to reflect/comment on own behavior, Able to manage/cope with feelings, Able to receive feedback, Able to self-disclose, Discussed coping, Discussed safety plan, and Identified triggers      Additional Notes:  Pt presented with a happy affect. She shared that she enjoys groups and that she wants to be better for her children.  Pt was also able to process and shared that her triggers are when she becomes paranoid     Holland Black

## 2022-10-12 NOTE — PROGRESS NOTES
Problem: Paranoid Ideation  Goal: *STG:Report interacting socially without fear or suspicion  Outcome: Progressing Towards Goal     Problem: Paranoid Ideation  Goal: *LTG: Show more trust in others by speaking positively of them & reporting comfort in socializing  Outcome: Progressing Towards Goal

## 2022-10-12 NOTE — PROGRESS NOTES
Progress Note        Room:239/01  Patient Nii Lyons     YOB: 1990     Age:32 y.o. Subjective    Subjective   Patient mostly to herself feeling slightly better. Still presents depressed withdrawn to herself no active command hallucinations no reported paranoia but she reports thoughts as being more clear. Review of Systems  Objective         Vitals Last 24 Hours:  TEMPERATURE:  Temp  Av °F (37.2 °C)  Min: 99 °F (37.2 °C)  Max: 99 °F (37.2 °C)  RESPIRATIONS RANGE: Resp  Av  Min: 18  Max: 18  PULSE OXIMETRY RANGE: SpO2  Av %  Min: 100 %  Max: 100 %  PULSE RANGE: Pulse  Av  Min: 88  Max: 88  BLOOD PRESSURE RANGE: Systolic (04CIR), BEL:904 , Min:127 , BOV:375   ; Diastolic (56RAE), JNK:80, Min:81, Max:81    I/O (24Hr): No intake or output data in the 24 hours ending 10/12/22 1219  Objective:  Vital signs: (most recent): Blood pressure 127/81, pulse 88, temperature 99 °F (37.2 °C), resp. rate 18, height 5' 2\" (1.575 m), weight 88.9 kg (196 lb), SpO2 100 %. Labs/Imaging/Diagnostics    Labs:  CBC:No results for input(s): WBC, RBC, HGB, HCT, MCV, RDW, PLT, HGBEXT, HCTEXT, PLTEXT in the last 72 hours. CHEMISTRIES:No results for input(s): NA, K, CL, CO2, BUN, CA, PHOS, MG in the last 72 hours. No lab exists for component: CREATININE, GLUCOSEPT/INR:No results for input(s): INR, INREXT in the last 72 hours. No lab exists for component: PROTIME  APTT:No results for input(s): APTT in the last 72 hours. LIVER PROFILE:No results for input(s): AST, ALT in the last 72 hours. No lab exists for component: Olivia Hemp, [facility]  Lab Results   Component Value Date/Time    ALT (SGPT) 41 10/07/2022 09:41 AM    AST (SGOT) 62 (H) 10/07/2022 09:41 AM    Alk. phosphatase 57 10/07/2022 09:41 AM    Bilirubin, total 0.9 10/07/2022 09:41 AM       Imaging Last 24 Hours:  No results found.   Assessment//Plan   Active Problems:    MDD (major depressive disorder) (10/7/2022)      Assessment & Plan  Continue Haldol 5 mg p.o. twice daily  Celexa 20 mg p.o. daily  Patient has received recently monthly decanoate injection as per her report. Encourage group therapy and to stressors leading to her hospitalizations and relapse prevention.       Current Facility-Administered Medications:     citalopram (CELEXA) tablet 20 mg, 20 mg, Oral, DAILY, Danisha Bergeron MD, 20 mg at 10/12/22 0853    OLANZapine (ZyPREXA) tablet 5 mg, 5 mg, Oral, Q6H PRN, Danisha Bergeron MD    benztropine (COGENTIN) tablet 1 mg, 1 mg, Oral, BID PRN, Gabino Graves MD    diphenhydrAMINE (BENADRYL) injection 50 mg, 50 mg, IntraMUSCular, BID PRN, Danisha Bergeron MD, 50 mg at 10/08/22 1346    hydrOXYzine HCL (ATARAX) tablet 50 mg, 50 mg, Oral, TID PRN, Gabino Graves MD, 50 mg at 10/08/22 0832    LORazepam (ATIVAN) injection 1 mg, 1 mg, IntraMUSCular, Q4H PRN, Danisha Bergeron MD, 1 mg at 10/08/22 1314    traZODone (DESYREL) tablet 50 mg, 50 mg, Oral, QHS PRN, Gabino Graves MD, 50 mg at 10/11/22 2126    acetaminophen (TYLENOL) tablet 650 mg, 650 mg, Oral, Q4H PRN, Danisha Bergeron MD    magnesium hydroxide (MILK OF MAGNESIA) 400 mg/5 mL oral suspension 30 mL, 30 mL, Oral, DAILY PRN, Danisha Bergeron MD    nicotine (NICODERM CQ) 21 mg/24 hr patch 1 Patch, 1 Patch, TransDERmal, DAILY, Danisha Bergeron MD, 1 Patch at 10/12/22 0853    haloperidol lactate (HALDOL) injection 5 mg, 5 mg, IntraMUSCular, Q6H PRN, Danisha Bergeron MD, 5 mg at 10/08/22 1315    haloperidoL (HALDOL) tablet 5 mg, 5 mg, Oral, BID, Danisha Bergeron MD, 5 mg at 10/12/22 0853    levothyroxine (SYNTHROID) tablet 100 mcg, 100 mcg, Oral, 6am, Lorenza Bergeron MD, 100 mcg at 10/12/22 7160   Electronically signed by Anaid Tee MD on 10/12/2022 at 12:19 PM

## 2022-10-12 NOTE — BH NOTES
Client is pleasant and cooperative. Alert and oriented x 3. Appearance is neat and clean. She has a good appetite and reports sleeping well. Denies feeling suicidal or homicidal.She has been attending scheduled groups and unit activities. Client was given discharge information along with personal belongings and verbalized understanding discharge plans. Escorted to the lobby by staff.

## 2022-10-12 NOTE — DISCHARGE SUMMARY
PSYCHIATRIC DISCHARGE SUMMARY         IDENTIFICATION:    Patient Name  Palak Shaver   Date of Birth 1990   [facility] [id number]   Medical Record Number  [id number]      Age  28 y.o. PCP Kip Alan MD   Admit date:  10/7/2022    Discharge date: 10/12/2022   Room Number  239/01  @ [facility]   Date of Service  10/12/2022            TYPE OF DISCHARGE: REGULAR               CONDITION AT DISCHARGE: stable       PROVISIONAL & DISCHARGE DIAGNOSES:      Schizoaffective disorder     CC & HISTORY OF PRESENT ILLNESS:  CC: Acute exacerbation of schizoaffective disorder, increasingly paranoid looking around and her ex-boyfriend which is coming after her. Has been not sleeping pacing and has been off of her p.o. medications         HISTORY OF PRESENT ILLNESS:      The patient, Palak Shaver, is a 28 y.o. BLACK/ female admitted to the behavioral health floor after patient presents to the emergency department with increasing paranoia actively responding to external stimuli. She states that her ex-boyfriend contracted STD from her and she believes he is coming after her. Patient has not been going to work and is not properly caring for her child because she is always hiding from this man. She also reported to have been calling mother all morning and waking her to leave work because she believes that her [place] also behind her mom and that her mom is in danger. Patient recently received her injection of Risperdal as per her mom but has been not taking her medications secondary to her current relationship who is manipulating her. Patient states that if she is at home that no one is safe. Today during assessment patient still presents with paranoia and disorganized thoughts denies any command hallucinations. No active SI or HI.   She states she feels safe here in the hospital.        PAST PSYCHIATRIC HISTORY:   Last hospitalization in January 2021 [address] at [facility] psychiatric and receiving decanoate injection but recently has been noncompliant with medications. SOCIAL HISTORY:    Social History     Socioeconomic History    Marital status: SINGLE     Spouse name: Not on file    Number of children: Not on file    Years of education: Not on file    Highest education level: Not on file   Occupational History    Not on file   Tobacco Use    Smoking status: Every Day     Packs/day: 0.25     Years: 2.00     Pack years: 0.50     Types: Cigarettes    Smokeless tobacco: Never   Substance and Sexual Activity    Alcohol use: No     Alcohol/week: 0.8 standard drinks     Types: 1 Glasses of wine per week     Comment: drinks 1 small wine a month    Drug use: No    Sexual activity: Yes     Birth control/protection: Implant     Comment: Edinson Lee   Other Topics Concern    Not on file   Social History Narrative    Not on file     Social Determinants of Health     Financial Resource Strain: Not on file   Food Insecurity: Not on file   Transportation Needs: Not on file   Physical Activity: Not on file   Stress: Not on file   Social Connections: Not on file   Intimate Partner Violence: Not on file   Housing Stability: Not on file      FAMILY HISTORY:   Family History   Problem Relation Age of Onset    Hypertension Mother              HOSPITALIZATION COURSE:    Ciera Adler was admitted to the inpatient psychiatric unit [facility] for acute psychiatric stabilization in regards to symptomatology as described in the HPI above. While on the unit Ciera Adler was involved in individual, group, occupational and milieu therapy. Psychiatric medications were adjusted during this hospitalization. Ciera Adler demonstrated a slow, but progressive improvement in overall condition. Much of patient's depression appeared to be related to situational stressors, inconsistent with medications and psychological factors.   Please see individual progress notes for more specific details regarding patient's hospitalization course. Patient has continued to progress well without any acute delusions hallucinations or paranoia. Patient feels safe to go home family meeting went well and mom feels her baseline. At time of discharge, Haven Manriquez is without significant problems of depression, psychosis, kade. Patient free of suicidal and homicidal ideations and reports many positive predictive factors in terms of not attempting suicide or homicide. Patient with request for discharge today. There are no grounds to seek a TDO. Patient has maximized benefit to be derived from acute inpatient psychiatric treatment. All members of the treatment team concur with each other in regards to plans for discharge today per patient's request.  Patient and family are aware and in agreement with discharge and discharge plan. LABS AND IMAGAING:    Labs Reviewed   METABOLIC PANEL, COMPREHENSIVE - Abnormal; Notable for the following components:       Result Value    Glucose 108 (*)     Creatinine 1.55 (*)     BUN/Creatinine ratio 10 (*)     eGFR 45 (*)     AST (SGOT) 62 (*)     Protein, total 8.7 (*)     All other components within normal limits   CBC WITH AUTOMATED DIFF - Abnormal; Notable for the following components:    RBC 3.59 (*)     .1 (*)     RDW 14.6 (*)     IMMATURE GRANULOCYTES 1 (*)     ABS. IMM.  GRANS. 0.1 (*)     All other components within normal limits   URINALYSIS W/ REFLEX CULTURE - Abnormal; Notable for the following components:    Appearance Turbid (*)     Specific gravity >1.030 (*)     Protein 100 (*)     Ketone 5 (*)     Blood Small (*)     Urobilinogen 2.0 (*)     Mucus 4+ (*)     All other components within normal limits   TSH 3RD GENERATION - Abnormal; Notable for the following components:    TSH >100.00 (*)     All other components within normal limits   COVID-19 WITH INFLUENZA A/B   ETHYL ALCOHOL   HCG URINE, QL   DRUG SCREEN, URINE     No results found for: DS35, PHEN, PHENO, PHENT, DILF, DS39, PHENY, PTN, VALF2, VALAC, VALP, VALPR, DS6, CRBAM, CRBAMP, CARB2, XCRBAM  Admission on 10/07/2022   Component Date Value Ref Range Status    Sodium 10/07/2022 136  136 - 145 mmol/L Final    Potassium 10/07/2022 3.8  3.5 - 5.1 mmol/L Final    Chloride 10/07/2022 104  97 - 108 mmol/L Final    CO2 10/07/2022 26  21 - 32 mmol/L Final    Anion gap 10/07/2022 6  5 - 15 mmol/L Final    Glucose 10/07/2022 108 (A)  65 - 100 mg/dL Final    BUN 10/07/2022 16  6 - 20 mg/dL Final    Creatinine 10/07/2022 1.55 (A)  0.55 - 1.02 mg/dL Final    BUN/Creatinine ratio 10/07/2022 10 (A)  12 - 20   Final    eGFR 10/07/2022 45 (A)  >60 ml/min/1.73m2 Final    Calcium 10/07/2022 9.7  8.5 - 10.1 mg/dL Final    Bilirubin, total 10/07/2022 0.9  0.2 - 1.0 mg/dL Final    AST (SGOT) 10/07/2022 62 (A)  15 - 37 U/L Final    ALT (SGPT) 10/07/2022 41  12 - 78 U/L Final    Alk.  phosphatase 10/07/2022 57  45 - 117 U/L Final    Protein, total 10/07/2022 8.7 (A)  6.4 - 8.2 g/dL Final    Albumin 10/07/2022 5.0  3.5 - 5.0 g/dL Final    Globulin 10/07/2022 3.7  2.0 - 4.0 g/dL Final    A-G Ratio 10/07/2022 1.4  1.1 - 2.2   Final    WBC 10/07/2022 8.6  3.6 - 11.0 K/uL Final    RBC 10/07/2022 3.59 (A)  3.80 - 5.20 M/uL Final    HGB 10/07/2022 11.8  11.5 - 16.0 g/dL Final    HCT 10/07/2022 36.3  35.0 - 47.0 % Final    MCV 10/07/2022 101.1 (A)  80.0 - 99.0 FL Final    MCH 10/07/2022 32.9  26.0 - 34.0 PG Final    MCHC 10/07/2022 32.5  30.0 - 36.5 g/dL Final    RDW 10/07/2022 14.6 (A)  11.5 - 14.5 % Final    PLATELET 30/38/8544 637  150 - 400 K/uL Final    MPV 10/07/2022 9.9  8.9 - 12.9 FL Final    NRBC 10/07/2022 0.0  0.0  WBC Final    ABSOLUTE NRBC 10/07/2022 0.00  0.00 - 0.01 K/uL Final    NEUTROPHILS 10/07/2022 69  32 - 75 % Final    LYMPHOCYTES 10/07/2022 20  12 - 49 % Final    MONOCYTES 10/07/2022 8  5 - 13 % Final    EOSINOPHILS 10/07/2022 1  0 - 7 % Final    BASOPHILS 10/07/2022 1  0 - 1 % Final    IMMATURE GRANULOCYTES 10/07/2022 1 (A)  0 - 0.5 % Final    ABS. NEUTROPHILS 10/07/2022 6.0  1.8 - 8.0 K/UL Final    ABS. LYMPHOCYTES 10/07/2022 1.7  0.8 - 3.5 K/UL Final    ABS. MONOCYTES 10/07/2022 0.7  0.0 - 1.0 K/UL Final    ABS. EOSINOPHILS 10/07/2022 0.1  0.0 - 0.4 K/UL Final    ABS. BASOPHILS 10/07/2022 0.1  0.0 - 0.1 K/UL Final    ABS. IMM. GRANS. 10/07/2022 0.1 (A)  0.00 - 0.04 K/UL Final    DF 10/07/2022 AUTOMATED    Final    ALCOHOL(ETHYL),SERUM 10/07/2022 <10  <10 mg/dL Final    HCG urine, QL 10/07/2022 Negative  Negative   Final    Color 10/07/2022 Yellow    Final    Appearance 10/07/2022 Turbid (A)  Clear   Final    Specific gravity 10/07/2022 >1.030 (A)  1.003 - 1.030 Final    pH (UA) 10/07/2022 5.0  5.0 - 8.0   Final    Protein 10/07/2022 100 (A)  Negative mg/dL Final    Glucose 10/07/2022 Negative  Negative mg/dL Final    Ketone 10/07/2022 5 (A)  Negative mg/dL Final    Bilirubin 10/07/2022 Negative  Negative   Final    Blood 10/07/2022 Small (A)  Negative   Final    Urobilinogen 10/07/2022 2.0 (A)  0.1 - 1.0 EU/dL Final    Nitrites 10/07/2022 Negative  Negative   Final    Leukocyte Esterase 10/07/2022 Negative  Negative   Final    WBC 10/07/2022 0-4  0 - 4 /hpf Final    RBC 10/07/2022 5-10  0 - 5 /hpf Final    Bacteria 10/07/2022 Negative  Negative /hpf Final    UA:UC IF INDICATED 10/07/2022 Culture not indicated by UA result  Culture not indicated by UA result   Final    Mucus 10/07/2022 4+ (A)  Negative /lpf Final    AMPHETAMINES 10/07/2022 Negative  Negative   Final    BARBITURATES 10/07/2022 Negative  Negative   Final    BENZODIAZEPINES 10/07/2022 Negative  Negative   Final    COCAINE 10/07/2022 Negative  Negative   Final    METHADONE 10/07/2022 Negative  Negative   Final    OPIATES 10/07/2022 Negative  Negative   Final    PCP(PHENCYCLIDINE) 10/07/2022 Negative  Negative   Final    THC (TH-CANNABINOL) 10/07/2022 Negative  Negative   Final    Drug screen comment 10/07/2022     Final                    Value: This test is a screen for drugs of abuse in a medical setting only (i.e., they are unconfirmed results and as such must not be used for non-medical purposes, e.g.,employment testing, legal testing). Due to its inherent nature, false positive (FP) and false negative (FN) results may be obtained. Therefore, if necessary for medical care, recommend confirmation of positive findings by GC/MS. SARS-CoV-2 by PCR 10/07/2022 Not Detected  Not Detected   Final    Influenza A by PCR 10/07/2022 Not Detected  Not Detected   Final    Influenza B by PCR 10/07/2022 Not Detected  Not Detected   Final    TSH 10/07/2022 >100.00 (A)  0.36 - 3.74 uIU/mL Final     No results found. DISPOSITION:    Patient to f/u with psychiatric and psychotherapy appointments. See case management documentation for services connected. FOLLOW-UP CARE:    Activity as tolerated  Regular Diet  Wound Care: none needed. Follow-up Information       Follow up With Specialties Details Why Addy 72  Follow up on 10/14/2022 2:45p telehealth appt with Dr Amanda Boles [address] [facility] [address] [facility]  Follow up they will be contacting you in regards to mobile crisis support and mental health skill building [phone]  [address] [place] [address]    [address], Titi Chanel, [address]   [place] [address]  [phone]                     PROGNOSIS:    Limited ---- based on nature of patient's pathology/ies and treatment compliance issues. DISCHARGE MEDICATIONS:    Informed consent given for the use of following psychotropic medications:  Current Discharge Medication List        CONTINUE these medications which have CHANGED    Details   citalopram (CELEXA) 20 mg tablet Take 1 Tablet by mouth daily.   Qty: 30 Tablet, Refills: 1  Start date: 10/13/2022      haloperidoL (HALDOL) 5 mg tablet Take 1 Tablet by mouth two (2) times a day. Qty: 60 Tablet, Refills: 1  Start date: 10/12/2022      levothyroxine (SYNTHROID) 100 mcg tablet Take 1 Tablet by mouth every morning. Qty: 30 Tablet, Refills: 1  Start date: 10/13/2022      traZODone (DESYREL) 50 mg tablet Take 1 Tablet by mouth nightly as needed for Sleep (For insomnia). Qty: 30 Tablet, Refills: 1  Start date: 10/12/2022           CONTINUE these medications which have NOT CHANGED    Details   haloperidol decanoate (HALDOL DECANOATE) 50 mg/mL injection 1 mL by IntraMUSCular route every twenty-eight (28) days. Next dose 3/10/2021  Qty: 1 Vial, Refills: 0      etonogestrel (IMPLANON) 68 mg impl by SubDERmal route.     Associated Diagnoses: Graves' disease; Goiter diffuse                    Signed:  Raj Mccarty MD  10/12/2022

## 2022-10-12 NOTE — BH NOTES
Behavioral Health Transition Record to Provider    Patient Name: Dread Brown  YOB: 1990  Medical Record Number: [id number]  Date of Admission: 10/7/2022  Date of Discharge: 10.12.22    Attending Provider: Katy Mueller MD  Discharging Provider: Dr Marcelle Martines  To contact this individual call [phone] and ask the  to page. If unavailable, ask to be transferred to [facility] Provider on call. [facility] Provider will be available on call 24/7 and during holidays. Primary Care Provider: Charmaine Montano MD    Allergies   Allergen Reactions    Aleve [Naproxen Sodium] Unknown (comments)    Benzocaine Swelling       Reason for Admission: Pt was admitted for increase in paranoia. Pt also reported increase in depression and anxieety due to stress in the home     Admission Diagnosis: MDD (major depressive disorder) [F32.9]    * No surgery found *    Results for orders placed or performed during the hospital encounter of 10/07/22   COVID-19 WITH INFLUENZA A/B   Result Value Ref Range    SARS-CoV-2 by PCR Not Detected Not Detected      Influenza A by PCR Not Detected Not Detected      Influenza B by PCR Not Detected Not Detected     METABOLIC PANEL, COMPREHENSIVE   Result Value Ref Range    Sodium 136 136 - 145 mmol/L    Potassium 3.8 3.5 - 5.1 mmol/L    Chloride 104 97 - 108 mmol/L    CO2 26 21 - 32 mmol/L    Anion gap 6 5 - 15 mmol/L    Glucose 108 (H) 65 - 100 mg/dL    BUN 16 6 - 20 mg/dL    Creatinine 1.55 (H) 0.55 - 1.02 mg/dL    BUN/Creatinine ratio 10 (L) 12 - 20      eGFR 45 (L) >60 ml/min/1.73m2    Calcium 9.7 8.5 - 10.1 mg/dL    Bilirubin, total 0.9 0.2 - 1.0 mg/dL    AST (SGOT) 62 (H) 15 - 37 U/L    ALT (SGPT) 41 12 - 78 U/L    Alk.  phosphatase 57 45 - 117 U/L    Protein, total 8.7 (H) 6.4 - 8.2 g/dL    Albumin 5.0 3.5 - 5.0 g/dL    Globulin 3.7 2.0 - 4.0 g/dL    A-G Ratio 1.4 1.1 - 2.2     CBC WITH AUTOMATED DIFF   Result Value Ref Range    WBC 8.6 3.6 - 11.0 K/uL    RBC 3.59 (L) 3.80 - 5.20 M/uL    HGB 11.8 11.5 - 16.0 g/dL    HCT 36.3 35.0 - 47.0 %    .1 (H) 80.0 - 99.0 FL    MCH 32.9 26.0 - 34.0 PG    MCHC 32.5 30.0 - 36.5 g/dL    RDW 14.6 (H) 11.5 - 14.5 %    PLATELET 671 506 - 966 K/uL    MPV 9.9 8.9 - 12.9 FL    NRBC 0.0 0.0  WBC    ABSOLUTE NRBC 0.00 0.00 - 0.01 K/uL    NEUTROPHILS 69 32 - 75 %    LYMPHOCYTES 20 12 - 49 %    MONOCYTES 8 5 - 13 %    EOSINOPHILS 1 0 - 7 %    BASOPHILS 1 0 - 1 %    IMMATURE GRANULOCYTES 1 (H) 0 - 0.5 %    ABS. NEUTROPHILS 6.0 1.8 - 8.0 K/UL    ABS. LYMPHOCYTES 1.7 0.8 - 3.5 K/UL    ABS. MONOCYTES 0.7 0.0 - 1.0 K/UL    ABS. EOSINOPHILS 0.1 0.0 - 0.4 K/UL    ABS. BASOPHILS 0.1 0.0 - 0.1 K/UL    ABS. IMM.  GRANS. 0.1 (H) 0.00 - 0.04 K/UL    DF AUTOMATED     ETHYL ALCOHOL   Result Value Ref Range    ALCOHOL(ETHYL),SERUM <10 <10 mg/dL   HCG URINE, QL   Result Value Ref Range    HCG urine, QL Negative Negative     URINALYSIS W/ REFLEX CULTURE    Specimen: Urine   Result Value Ref Range    Color Yellow      Appearance Turbid (A) Clear      Specific gravity >1.030 (H) 1.003 - 1.030    pH (UA) 5.0 5.0 - 8.0      Protein 100 (A) Negative mg/dL    Glucose Negative Negative mg/dL    Ketone 5 (A) Negative mg/dL    Bilirubin Negative Negative      Blood Small (A) Negative      Urobilinogen 2.0 (H) 0.1 - 1.0 EU/dL    Nitrites Negative Negative      Leukocyte Esterase Negative Negative      WBC 0-4 0 - 4 /hpf    RBC 5-10 0 - 5 /hpf    Bacteria Negative Negative /hpf    UA:UC IF INDICATED Culture not indicated by UA result Culture not indicated by UA result      Mucus 4+ (A) Negative /lpf   DRUG SCREEN, URINE   Result Value Ref Range    AMPHETAMINES Negative Negative      BARBITURATES Negative Negative      BENZODIAZEPINES Negative Negative      COCAINE Negative Negative      METHADONE Negative Negative      OPIATES Negative Negative      PCP(PHENCYCLIDINE) Negative Negative      THC (TH-CANNABINOL) Negative Negative      Drug screen comment        This test is a screen for drugs of abuse in a medical setting only (i.e., they are unconfirmed results and as such must not be used for non-medical purposes, e.g.,employment testing, legal testing). Due to its inherent nature, false positive (FP) and false negative (FN) results may be obtained. Therefore, if necessary for medical care, recommend confirmation of positive findings by GC/MS. TSH 3RD GENERATION   Result Value Ref Range    TSH >100.00 (H) 0.36 - 3.74 uIU/mL       Immunizations administered during this encounter: There is no immunization history on file for this patient. Screening for Metabolic Disorders for Patients on Antipsychotic Medications  (Data obtained from the EMR)    Estimated Body Mass Index  Estimated body mass index is 35.85 kg/m² as calculated from the following:    Height as of this encounter: 5' 2\" (1.575 m). Weight as of this encounter: 88.9 kg (196 lb). Vital Signs/Blood Pressure  Visit Vitals  /81   Pulse 88   Temp 99 °F (37.2 °C)   Resp 18   Ht 5' 2\" (1.575 m)   Wt 88.9 kg (196 lb)   SpO2 100%   BMI 35.85 kg/m²       Blood Glucose/Hemoglobin A1c  Lab Results   Component Value Date/Time    Glucose 108 (H) 10/07/2022 09:41 AM       No results found for: HBA1C, [id number]     Lipid Panel  No results found for: CHOL, CHOLX, CHLST, CHOLV, 346260, HDL, HDLP, LDL, LDLC, DLDLP, TGLX, TRIGL, TRIGP, CHHD, CHHDX     Discharge Diagnosis: schizoaffective disorder     Discharge Plan: pt will be returning home and has follow ups coordinated with [facility] and [address] [place] [address]    Discharge Medication List and Instructions:   Current Discharge Medication List        CONTINUE these medications which have CHANGED    Details   citalopram (CELEXA) 20 mg tablet Take 1 Tablet by mouth daily. Qty: 30 Tablet, Refills: 1  Start date: 10/13/2022      haloperidoL (HALDOL) 5 mg tablet Take 1 Tablet by mouth two (2) times a day.   Qty: 60 Tablet, Refills: 1  Start date: 10/12/2022 levothyroxine (SYNTHROID) 100 mcg tablet Take 1 Tablet by mouth every morning. Qty: 30 Tablet, Refills: 1  Start date: 10/13/2022      traZODone (DESYREL) 50 mg tablet Take 1 Tablet by mouth nightly as needed for Sleep (For insomnia). Qty: 30 Tablet, Refills: 1  Start date: 10/12/2022           CONTINUE these medications which have NOT CHANGED    Details   haloperidol decanoate (HALDOL DECANOATE) 50 mg/mL injection 1 mL by IntraMUSCular route every twenty-eight (28) days. Next dose 3/10/2021  Qty: 1 Vial, Refills: 0      etonogestrel (IMPLANON) 68 mg impl by SubDERmal route. Associated Diagnoses: Graves' disease; Goiter diffuse             Unresulted Labs (24h ago, onward)      None          To obtain results of studies pending at discharge, please contact [phone]    Follow-up Information       Follow up With Specialties Details Why Addy 72  Follow up on 10/14/2022 2:45p telehealth appt with Dr Piter Morales [address] [facility] [address] [facility]  Follow up they will be contacting you in regards to mobile crisis support and mental health skill building [id number] , Cassandra Bowen            Advanced Directive:   Does the patient have an appointed surrogate decision maker? No  Does the patient have a Medical Advance Directive? No  Does the patient have a Psychiatric Advance Directive? No  If the patient does not have a surrogate or Medical Advance Directive AND Psychiatric Advance Directive, the patient was offered information on these advance directives Patient will complete at a later time    Patient Instructions: Please continue all medications until otherwise directed by physician. Tobacco Cessation Discharge Plan:   Is the patient a smoker and needs referral for smoking cessation?  Not applicable  Patient referred to the following for smoking cessation with an appointment? Not applicable     Patient was offered medication to assist with smoking cessation at discharge? Not applicable  Was education for smoking cessation added to the discharge instructions? Not applicable    Alcohol/Substance Abuse Discharge Plan:   Does the patient have a history of substance/alcohol abuse and requires a referral for treatment? Not applicable  Patient referred to the following for substance/alcohol abuse treatment with an appointment? Not applicable  Patient was offered medication to assist with alcohol cessation at discharge? Not applicable  Was education for substance/alcohol abuse added to discharge instructions? Not applicable    Patient discharged to Home; provided to the patient/caregiver either in hard copy or electronically.

## 2022-10-12 NOTE — BH NOTES
DISCHARGE SUMMARY    Danyel Loving  : 1990  MRN: [id number]    The patient Nguyen Iniguez exhibits the ability to control behavior in a less restrictive environment. Patient's level of functioning is improving. No assaultive/destructive behavior has been observed for the past 24 hours. No suicidal/homicidal threat or behavior has been observed for the past 24 hours. There is no evidence of serious medication side effects. Patient has not been in physical or protective restraints for at least the past 24 hours. If weapons involved, how are they secured? N/a    Is patient aware of and in agreement with discharge plan? yes    Arrangements for medication:  Prescriptions yes    Copy of discharge instructions to provider?:  yes    Arrangements for transportation home:  pt will be picked up at 1p by family     Keep all follow up appointments as scheduled, continue to take prescribed medications per physician instructions.   Mental health crisis number:  365 or your local mental health crisis line number at [address] [facility] Emergency WARM LINE      4-539-584-MHAV ([phone])      M-F: 9am to 9pm      Sat & Sun: 5pm - 9pm  National suicide prevention lines:                             1-688-UQDLAVB ([phone])       2-778-845-TALK ([phone])    Crisis Text Line:  Text HOME to [phone]

## 2022-11-22 ENCOUNTER — OFFICE VISIT (OUTPATIENT)
Dept: OBGYN CLINIC | Age: 32
End: 2022-11-22
Payer: MEDICAID

## 2022-11-22 VITALS
WEIGHT: 193.7 LBS | BODY MASS INDEX: 35.64 KG/M2 | TEMPERATURE: 97.5 F | HEART RATE: 74 BPM | RESPIRATION RATE: 18 BRPM | SYSTOLIC BLOOD PRESSURE: 138 MMHG | OXYGEN SATURATION: 98 % | DIASTOLIC BLOOD PRESSURE: 95 MMHG | HEIGHT: 62 IN

## 2022-11-22 DIAGNOSIS — Z01.419 ENCOUNTER FOR GYNECOLOGICAL EXAMINATION WITHOUT ABNORMAL FINDING: Primary | ICD-10-CM

## 2022-11-22 DIAGNOSIS — Z11.3 VENEREAL DISEASE SCREENING: ICD-10-CM

## 2022-11-22 DIAGNOSIS — Z86.19 HISTORY OF CHLAMYDIA: ICD-10-CM

## 2022-11-22 DIAGNOSIS — Z12.39 ENCOUNTER FOR BREAST CANCER SCREENING USING NON-MAMMOGRAM MODALITY: ICD-10-CM

## 2022-11-22 DIAGNOSIS — Z12.4 SCREENING FOR CERVICAL CANCER: ICD-10-CM

## 2022-11-22 NOTE — PROGRESS NOTES
HPI: Maribel Hutson is a 28 y.o. female , LMP 22, who presents today for the following:  Chief Complaint   Patient presents with    Annual Exam     Wants the nexplanon        She denies abnormal vaginal bleeding, vaginal/vulvar pruritus; abnormal vaginal discharge or vaginal odor. Desires Nexplanon. Has had it before. H/o LSIL. H/o ?CKC. H/o trichomonas and chlamydia (1month ago for the latter). Last mammogram: never. Last colonoscopy: never. Past Medical History:   Diagnosis Date    Abnormal Pap smear of cervix 08/15/2020    Acute vaginitis 08/15/2020    Anxiety     Asthma     Depression     Goiter     Graves disease     Hx of seasonal allergies     Hyperthyroidism     Trichomonosis 08/15/2020       Past Surgical History:   Procedure Laterality Date    CKC, AKA COLD KNIFE CONE      possibly    HX OTHER SURGICAL      nasal polyp 3/22/2013, 2008    HX POLYPECTOMY      nasal polypectomy       Family History   Problem Relation Age of Onset    Stroke Mother     Hypertension Mother     Cancer Other     Breast Cancer Maternal Aunt        Social History     Socioeconomic History    Marital status: SINGLE     Spouse name: Not on file    Number of children: Not on file    Years of education: Not on file    Highest education level: 12th grade   Occupational History    Occupation: [employer]er[employer] at [employer]    Smoking status: Every Day     Packs/day: 0.25     Years: 2.00     Pack years: 0.50     Types: Cigarettes    Smokeless tobacco: Never   Vaping Use    Vaping Use: Never used   Substance and Sexual Activity    Alcohol use: Yes     Alcohol/week: 0.8 standard drinks     Types: 1 Glasses of wine per week     Comment: drinks 1 small wine a month    Drug use: No    Sexual activity: Yes     Birth control/protection: None     Comment: h/o trichomona and chlamydia. H/o LSIl. H/o ?CKC.    Other Topics Concern    Not on file   Social History Narrative    Not on file     Social Determinants of Health     Financial Resource Strain: Not on file   Food Insecurity: Not on file   Transportation Needs: Not on file   Physical Activity: Inactive    Days of Exercise per Week: 0 days    Minutes of Exercise per Session: 0 min   Stress: Not on file   Social Connections: Not on file   Intimate Partner Violence: Not At Risk    Fear of Current or Ex-Partner: No    Emotionally Abused: No    Physically Abused: No    Sexually Abused: No   Housing Stability: Not on file       Allergies   Allergen Reactions    Aleve [Naproxen Sodium] Unknown (comments)    Benzocaine Swelling          Current Outpatient Medications:     citalopram (CELEXA) 20 mg tablet, Take 1 Tablet by mouth daily. , Disp: 30 Tablet, Rfl: 1    haloperidoL (HALDOL) 5 mg tablet, Take 1 Tablet by mouth two (2) times a day., Disp: 60 Tablet, Rfl: 1    levothyroxine (SYNTHROID) 100 mcg tablet, Take 1 Tablet by mouth every morning., Disp: 30 Tablet, Rfl: 1    traZODone (DESYREL) 50 mg tablet, Take 1 Tablet by mouth nightly as needed for Sleep (For insomnia). , Disp: 30 Tablet, Rfl: 1    haloperidol decanoate (HALDOL DECANOATE) 50 mg/mL injection, 1 mL by IntraMUSCular route every twenty-eight (28) days. Next dose 3/10/2021, Disp: 1 Vial, Rfl: 0         Review of Systems: Denies issues with eyes, ears, mouth, nose. Denies fevers/chills, significant weight loss/gain. Denies chest pain, shortness of breath, nausea, vomiting, constipation, diarrhea or abdominal pain. Denies dysuria. Denies muscle aches, weakness, numbness or tingling. Denies issues with breasts. Denies bleeding/clotting d/o's. Denies anxiety/depression, S/HI.      OBJECTIVE:  BP (!) 138/95 (BP 1 Location: Right arm, BP Patient Position: Sitting, BP Cuff Size: Adult)   Pulse 74   Temp 97.5 °F (36.4 °C) (Temporal)   Resp 18   Ht 5' 2\" (1.575 m)   Wt 193 lb 11.2 oz (87.9 kg)   LMP 11/17/2022 (Exact Date)   SpO2 98%   BMI 35.43 kg/m²      Constitutional  Appearance: well-nourished, well developed, alert, in no acute distress    HENT  Head and Face: appears normal    Neck  Inspection/Palpation: normal appearance      Breasts  Symmetric, no palpable masses, no tenderness, no skin changes, no nipple abnormality, no nipple discharge, no axillary or supraclavicular lymphadenopathy. Chest  Respiratory Effort: normal      Gastrointestinal  Abdominal Examination: abdomen non-tender to palpation, no masses present  Liver and spleen: no hepatomegaly present, spleen not palpable      Genitourinary  External Genitalia: normal appearance for age, no discharge present, no tenderness present, no inflammatory lesions present, no masses present, no atrophy present  Vagina: normal vaginal vault without central or paravaginal defects, no discharge present, no inflammatory lesions present, no masses present  Bladder: non-tender to palpation  Urethra: appears normal  Cervix: normal, no cervical motion tenderness; small amt of menstrual blood noted, pap smear obtained  Uterus: normal size, shape and consistency  Adnexa: no adnexal tenderness present, no adnexal masses present  Perineum: perineum within normal limits, no evidence of trauma, no rashes or skin lesions present  Anus: anus within normal limits, no hemorrhoids present    Skin  General Inspection: no rash, hidradenitis suppurativa non draining 5mm nodules in b/l axilla and on mons    Neurologic/Psychiatric  Mental Status:  Orientation: grossly oriented to person, place and time  Mood and Affect: mood normal, affect appropriate          Assessment/plan:    ICD-10-CM ICD-9-CM    1. Encounter for gynecological examination without abnormal finding  Z01.419 V72.31       2. Screening for cervical cancer  Z12.4 V76.2 PAP IG, CT-NG-TV, APTIMA HPV AND RFX 35/08,11([id number])      CANCELED: PAP IG, APTIMA HPV AND RFX 16/18,45 ([id number])      3. Venereal disease screening  Z11.3 V74.5 PAP IG, CT-NG-TV, APTIMA HPV AND RFX 44/67,53([id number])      4.  Encounter for breast cancer screening using non-mammogram modality  Z12.39 V76.10       5. History of chlamydia  Z86.19 V12.09            -Annual gynecologic exam.    -Cervical cancer screening- pap smear and HPV co testing obtained today. -Breast cancer screening- breast awareness discussed; mammograms beginning at age 36.    -STI screening-accepts testing: G/C/T ordered. Declines bloodwork. -HPV vaccination- counseled. Reading material given. She is to let us know if she desires this.     -Colon cancer screening- colonoscopy starting at age 39.     -Tobacco cessation discussed.     -Nexplanon desires. Reviewed potential side effects to include but not be limited to amenorrhea, irregular menses, GI upset, mood swings, headaches, weight gain.

## 2022-11-22 NOTE — PROGRESS NOTES
Chief Complaint   Patient presents with    Annual Exam     Wants the nexplanon      1. Have you been to the ER, urgent care clinic since your last visit? Hospitalized since your last visit? Yes Where: [facility]    2. Have you seen or consulted any other health care providers outside of the [address] [place] [address] since your last visit? Include any pap smears or colon screening.  No    Visit Vitals  BP (!) 138/95 (BP 1 Location: Right arm, BP Patient Position: Sitting, BP Cuff Size: Adult)   Pulse 74   Temp 97.5 °F (36.4 °C) (Temporal)   Resp 18   Ht 5' 2\" (1.575 m)   Wt 193 lb 11.2 oz (87.9 kg)   LMP 11/17/2022 (Exact Date)   SpO2 98%   BMI 35.43 kg/m²

## 2022-11-26 LAB
C TRACH RRNA CVX QL NAA+PROBE: NEGATIVE
CYTOLOGIST CVX/VAG CYTO: NORMAL
CYTOLOGY CVX/VAG DOC CYTO: NORMAL
CYTOLOGY CVX/VAG DOC THIN PREP: NORMAL
DX ICD CODE: NORMAL
HPV GENOTYPE REFLEX: NORMAL
HPV I/H RISK 4 DNA CVX QL PROBE+SIG AMP: NEGATIVE
Lab: NORMAL
N GONORRHOEA RRNA CVX QL NAA+PROBE: NEGATIVE
OTHER STN SPEC: NORMAL
STAT OF ADQ CVX/VAG CYTO-IMP: NORMAL
T VAGINALIS RRNA SPEC QL NAA+PROBE: NEGATIVE

## 2022-12-03 PROBLEM — Z86.19 HISTORY OF CHLAMYDIA: Status: ACTIVE | Noted: 2022-12-03

## 2023-05-17 RX ORDER — CITALOPRAM 20 MG/1
20 TABLET ORAL DAILY
COMMUNITY
Start: 2022-10-13

## 2023-05-17 RX ORDER — LEVOTHYROXINE SODIUM 0.1 MG/1
1 TABLET ORAL EVERY MORNING
COMMUNITY
Start: 2022-10-13

## 2023-05-17 RX ORDER — TRAZODONE HYDROCHLORIDE 50 MG/1
50 TABLET ORAL
COMMUNITY
Start: 2022-10-12

## 2023-05-17 RX ORDER — HALOPERIDOL 5 MG/1
5 TABLET ORAL 2 TIMES DAILY
COMMUNITY
Start: 2022-10-12

## 2025-01-04 ENCOUNTER — APPOINTMENT (OUTPATIENT)
Facility: HOSPITAL | Age: 35
End: 2025-01-04
Payer: MEDICAID

## 2025-01-04 ENCOUNTER — HOSPITAL ENCOUNTER (EMERGENCY)
Facility: HOSPITAL | Age: 35
Discharge: HOME | End: 2025-01-04
Attending: EMERGENCY MEDICINE
Payer: MEDICAID

## 2025-01-04 VITALS
RESPIRATION RATE: 17 BRPM | DIASTOLIC BLOOD PRESSURE: 75 MMHG | BODY MASS INDEX: 29.44 KG/M2 | WEIGHT: 160 LBS | SYSTOLIC BLOOD PRESSURE: 111 MMHG | TEMPERATURE: 97.8 F | HEART RATE: 74 BPM | HEIGHT: 62 IN | OXYGEN SATURATION: 100 %

## 2025-01-04 DIAGNOSIS — R20.2 ARM PARESTHESIA, LEFT: Primary | ICD-10-CM

## 2025-01-04 DIAGNOSIS — K04.7 DENTAL ABSCESS: ICD-10-CM

## 2025-01-04 DIAGNOSIS — E23.7 PITUITARY ABNORMALITY (HCC): ICD-10-CM

## 2025-01-04 PROCEDURE — 72125 CT NECK SPINE W/O DYE: CPT

## 2025-01-04 PROCEDURE — 70450 CT HEAD/BRAIN W/O DYE: CPT

## 2025-01-04 PROCEDURE — 99284 EMERGENCY DEPT VISIT MOD MDM: CPT

## 2025-01-04 RX ORDER — METHYLPREDNISOLONE 4 MG/1
TABLET ORAL
Qty: 21 TABLET | Refills: 0 | Status: SHIPPED | OUTPATIENT
Start: 2025-01-04 | End: 2025-01-10

## 2025-01-04 RX ORDER — AMOXICILLIN 500 MG/1
500 CAPSULE ORAL 3 TIMES DAILY
Qty: 30 CAPSULE | Refills: 0 | Status: SHIPPED | OUTPATIENT
Start: 2025-01-04 | End: 2025-01-14

## 2025-01-04 ASSESSMENT — LIFESTYLE VARIABLES
HOW MANY STANDARD DRINKS CONTAINING ALCOHOL DO YOU HAVE ON A TYPICAL DAY: PATIENT DOES NOT DRINK
HOW OFTEN DO YOU HAVE A DRINK CONTAINING ALCOHOL: NEVER

## 2025-01-04 ASSESSMENT — PAIN - FUNCTIONAL ASSESSMENT: PAIN_FUNCTIONAL_ASSESSMENT: NONE - DENIES PAIN

## 2025-01-04 NOTE — DISCHARGE INSTRUCTIONS
Thank you for choosing our Emergency Department for your care.  It is our privilege to care for you in your time of need.  In the next several days, you may receive a survey via email or mailed to your home about your experience with our team.  We would greatly appreciate you taking a few minutes to complete the survey, as we use this information to learn what we have done well and what we could be doing better. Thank you for trusting us with your care!    Below you will find a list of your tests from today's visit.   Labs and Radiology Studies  No results found for this or any previous visit (from the past 12 hour(s)).  CT Head W/O Contrast    Result Date: 1/4/2025  HEAD CT AND CERVICAL SPINE WITHOUT CONTRAST: 1/4/2025 1:46 PM INDICATION: numb COMPARISON: None. PROCEDURE: Axial images of the head and cervical spine were obtained without contrast. Coronal and sagittal reformats were performed. CT dose reduction was achieved through use of a standardized protocol tailored for this examination and automatic exposure control for dose modulation. FINDINGS: Head: The ventricles and sulci are appropriate in size and configuration for age. No loss of gray-white differentiation to suggest late acute or early subacute infarction. No mass effect or intracranial hemorrhage. 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 Post bilateral maxillary antrectomies and right ethmoid floor and middle chondral resection. Extensive paranasal sinus mucosal disease is associated. Cervical: No fracture or dislocation. There is straightening of normal cervical lordosis. There are large caries of the left third maxillary and mandibular molars, and the right third maxillary molar. Root abscesses are associated. Degenerative disease causes the following stenoses: C2-C3: Mild canal stenosis. C3-C4:  No stenosis. C4-C5:  No stenosis. C5-C6:  No stenosis. C6-C7: Mild left neural foraminal stenosis. Mild canal stenosis. C7-T1: Mild  Electronically signed by Moises Villarreal    ------------------------------------------------------------------------------------------------------------  The evaluation and treatment you received in the Emergency Department were for an urgent problem. It is important that you follow-up with a doctor, nurse practitioner, or physician assistant to:  (1) confirm your diagnosis,  (2) re-evaluation of changes in your illness and treatment, and (3) for ongoing care. Please take your discharge instructions with you when you go to your follow-up appointment.     If you have any problem arranging a follow-up appointment, contact us!  If your symptoms become worse or you do not improve as expected, please return to us. We are available 24 hours a day.     If a prescription has been provided, please fill it as soon as possible to prevent a delay in treatment. If you have any questions or reservations about taking the medication due to side effects or interactions with other medications, please call your primary care provider or contact us directly.  Again, THANK YOU for choosing us to care for YOU!

## 2025-01-04 NOTE — ED PROVIDER NOTES
Ear: External ear normal.      Nose: Nose normal. No congestion or rhinorrhea.      Mouth/Throat:      Mouth: Mucous membranes are moist.   Eyes:      Extraocular Movements: Extraocular movements intact.      Conjunctiva/sclera: Conjunctivae normal.      Pupils: Pupils are equal, round, and reactive to light.   Cardiovascular:      Rate and Rhythm: Normal rate and regular rhythm.      Pulses: Normal pulses.      Heart sounds: Normal heart sounds.   Pulmonary:      Effort: Pulmonary effort is normal. No respiratory distress.      Breath sounds: Normal breath sounds.   Abdominal:      General: Abdomen is flat. There is no distension.      Tenderness: There is no abdominal tenderness.   Musculoskeletal:         General: No swelling or deformity. Normal range of motion.      Cervical back: Normal range of motion.   Skin:     General: Skin is warm.      Coloration: Skin is not jaundiced or pale.   Neurological:      General: No focal deficit present.      Mental Status: She is alert and oriented to person, place, and time. Mental status is at baseline.      Comments: Left hand paresthesia finger 543.  Normal .   Psychiatric:         Mood and Affect: Mood normal.         Behavior: Behavior normal.         Thought Content: Thought content normal.         Judgment: Judgment normal.           SCREENINGS                No data recorded    LAB, EKG AND DIAGNOSTIC RESULTS   Labs:  No results found for this or any previous visit (from the past 12 hour(s)).    EKG:.Not Applicable    Radiologic Studies:  Non-plain film images such as CT, Ultrasound and MRI are read by the radiologist. Plain radiographic images are visualized and preliminarily interpreted by the ED Provider with the following findings: See ED Course Below    Interpretation per the Radiologist below, if available at the time of this note:  CT Head W/O Contrast   Final Result   1. No acute intracranial process.   2. 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   3. Extensive  paranasal sinus mucosal disease associated with sinus surgery.   4. Large molar caries and root abscesses.      Electronically signed by Moises Villarreal      CT CSpine W/O Contrast   Final Result   1. No acute intracranial process.   2. 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   3. Extensive paranasal sinus mucosal disease associated with sinus surgery.   4. Large molar caries and root abscesses.      Electronically signed by Moises Villarreal           ED COURSE and DIFFERENTIAL DIAGNOSIS/MDM   2:43 PM Differential and Considerations: 44-year-old presents with paresthesias.  Unilateral only on one hand.  Low suspicion of stroke.  Will obtain CT to evaluate for herniated disc versus ICH mass consider MS.  Will give patient neurology to follow-up outpatient for neurotesting as needed if not improved with a Medrol Dosepak.    Records Reviewed (source and summary of external notes): Prior medical records and Nursing notes.    Vitals:    Vitals:    01/04/25 1256   BP: 111/75   Pulse: 74   Resp: 17   Temp: 97.8 °F (36.6 °C)   TempSrc: Oral   SpO2: 100%   Weight: 72.6 kg (160 lb)   Height: 1.575 m (5' 2\")        ED COURSE  ED Course as of 01/04/25 1443   Sat Jan 04, 2025   1405 CT CSpine W/O Contrast  IMPRESSION:  1. No acute intracranial process.  2. 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  3. Extensive paranasal sinus mucosal disease associated with sinus surgery.  4. Large molar caries and root abscesses.     Electronically signed by Moises Villarreal   [HP]   1407 Will treat for radiculopathy with steroids.  Encourage neurology follow-up if the paresthesias persist [HP]      ED Course User Index  [HP] Arabella Mohr MD       SEPSIS Reassessment: This patient does NOT meet Sepsis criteria after ED workup    Clinical Management Tools:  Not Applicable    Patient was given the following medications:  Medications - No data to display    CONSULTS: See ED Course/MDM for further details.  None     Social Determinants affecting